# Patient Record
Sex: FEMALE | Race: WHITE | HISPANIC OR LATINO | URBAN - METROPOLITAN AREA
[De-identification: names, ages, dates, MRNs, and addresses within clinical notes are randomized per-mention and may not be internally consistent; named-entity substitution may affect disease eponyms.]

---

## 2017-11-20 ENCOUNTER — EMERGENCY (EMERGENCY)
Facility: HOSPITAL | Age: 72
LOS: 1 days | Discharge: HOME | End: 2017-11-20
Attending: EMERGENCY MEDICINE
Payer: MEDICARE

## 2017-11-20 VITALS
OXYGEN SATURATION: 97 % | RESPIRATION RATE: 19 BRPM | TEMPERATURE: 98 F | SYSTOLIC BLOOD PRESSURE: 158 MMHG | DIASTOLIC BLOOD PRESSURE: 96 MMHG | WEIGHT: 160.06 LBS | HEIGHT: 59 IN | HEART RATE: 105 BPM

## 2017-11-20 VITALS — HEART RATE: 78 BPM | DIASTOLIC BLOOD PRESSURE: 70 MMHG | SYSTOLIC BLOOD PRESSURE: 124 MMHG

## 2017-11-20 DIAGNOSIS — Z90.49 ACQUIRED ABSENCE OF OTHER SPECIFIED PARTS OF DIGESTIVE TRACT: ICD-10-CM

## 2017-11-20 DIAGNOSIS — R10.9 UNSPECIFIED ABDOMINAL PAIN: ICD-10-CM

## 2017-11-20 DIAGNOSIS — Z88.2 ALLERGY STATUS TO SULFONAMIDES: ICD-10-CM

## 2017-11-20 DIAGNOSIS — R39.15 URGENCY OF URINATION: ICD-10-CM

## 2017-11-20 DIAGNOSIS — Z90.710 ACQUIRED ABSENCE OF BOTH CERVIX AND UTERUS: ICD-10-CM

## 2017-11-20 DIAGNOSIS — R30.0 DYSURIA: ICD-10-CM

## 2017-11-20 LAB
ALBUMIN SERPL ELPH-MCNC: 3.1 G/DL — LOW (ref 3.5–5)
ALP SERPL-CCNC: 70 U/L — SIGNIFICANT CHANGE UP (ref 40–120)
ALT FLD-CCNC: 22 U/L DA — SIGNIFICANT CHANGE UP (ref 10–60)
ANION GAP SERPL CALC-SCNC: 10 MMOL/L — SIGNIFICANT CHANGE UP (ref 5–17)
APPEARANCE UR: CLEAR — SIGNIFICANT CHANGE UP
AST SERPL-CCNC: 19 U/L — SIGNIFICANT CHANGE UP (ref 10–40)
BASOPHILS # BLD AUTO: 0.1 K/UL — SIGNIFICANT CHANGE UP (ref 0–0.2)
BASOPHILS NFR BLD AUTO: 0.6 % — SIGNIFICANT CHANGE UP (ref 0–2)
BILIRUB SERPL-MCNC: 0.2 MG/DL — SIGNIFICANT CHANGE UP (ref 0.2–1.2)
BILIRUB UR-MCNC: NEGATIVE — SIGNIFICANT CHANGE UP
BUN SERPL-MCNC: 22 MG/DL — HIGH (ref 7–18)
CALCIUM SERPL-MCNC: 8.7 MG/DL — SIGNIFICANT CHANGE UP (ref 8.4–10.5)
CHLORIDE SERPL-SCNC: 107 MMOL/L — SIGNIFICANT CHANGE UP (ref 96–108)
CO2 SERPL-SCNC: 23 MMOL/L — SIGNIFICANT CHANGE UP (ref 22–31)
COLOR SPEC: YELLOW — SIGNIFICANT CHANGE UP
CREAT SERPL-MCNC: 0.84 MG/DL — SIGNIFICANT CHANGE UP (ref 0.5–1.3)
DIFF PNL FLD: ABNORMAL
EOSINOPHIL # BLD AUTO: 0.1 K/UL — SIGNIFICANT CHANGE UP (ref 0–0.5)
EOSINOPHIL NFR BLD AUTO: 0.4 % — SIGNIFICANT CHANGE UP (ref 0–6)
GLUCOSE SERPL-MCNC: 122 MG/DL — HIGH (ref 70–99)
GLUCOSE UR QL: NEGATIVE — SIGNIFICANT CHANGE UP
HCT VFR BLD CALC: 43.7 % — SIGNIFICANT CHANGE UP (ref 34.5–45)
HGB BLD-MCNC: 14.2 G/DL — SIGNIFICANT CHANGE UP (ref 11.5–15.5)
KETONES UR-MCNC: NEGATIVE — SIGNIFICANT CHANGE UP
LEUKOCYTE ESTERASE UR-ACNC: NEGATIVE — SIGNIFICANT CHANGE UP
LIDOCAIN IGE QN: 117 U/L — SIGNIFICANT CHANGE UP (ref 73–393)
LYMPHOCYTES # BLD AUTO: 15.9 % — SIGNIFICANT CHANGE UP (ref 13–44)
LYMPHOCYTES # BLD AUTO: 2.2 K/UL — SIGNIFICANT CHANGE UP (ref 1–3.3)
MCHC RBC-ENTMCNC: 29.1 PG — SIGNIFICANT CHANGE UP (ref 27–34)
MCHC RBC-ENTMCNC: 32.4 GM/DL — SIGNIFICANT CHANGE UP (ref 32–36)
MCV RBC AUTO: 89.8 FL — SIGNIFICANT CHANGE UP (ref 80–100)
MONOCYTES # BLD AUTO: 0.9 K/UL — SIGNIFICANT CHANGE UP (ref 0–0.9)
MONOCYTES NFR BLD AUTO: 6.3 % — SIGNIFICANT CHANGE UP (ref 2–14)
NEUTROPHILS # BLD AUTO: 10.6 K/UL — HIGH (ref 1.8–7.4)
NEUTROPHILS NFR BLD AUTO: 76.7 % — SIGNIFICANT CHANGE UP (ref 43–77)
NITRITE UR-MCNC: NEGATIVE — SIGNIFICANT CHANGE UP
PH UR: 6 — SIGNIFICANT CHANGE UP (ref 5–8)
PLATELET # BLD AUTO: 332 K/UL — SIGNIFICANT CHANGE UP (ref 150–400)
POTASSIUM SERPL-MCNC: 3.9 MMOL/L — SIGNIFICANT CHANGE UP (ref 3.5–5.3)
POTASSIUM SERPL-SCNC: 3.9 MMOL/L — SIGNIFICANT CHANGE UP (ref 3.5–5.3)
PROT SERPL-MCNC: 7.7 G/DL — SIGNIFICANT CHANGE UP (ref 6–8.3)
PROT UR-MCNC: 30 MG/DL
RBC # BLD: 4.86 M/UL — SIGNIFICANT CHANGE UP (ref 3.8–5.2)
RBC # FLD: 13 % — SIGNIFICANT CHANGE UP (ref 10.3–14.5)
SODIUM SERPL-SCNC: 140 MMOL/L — SIGNIFICANT CHANGE UP (ref 135–145)
SP GR SPEC: 1.02 — SIGNIFICANT CHANGE UP (ref 1.01–1.02)
UROBILINOGEN FLD QL: NEGATIVE — SIGNIFICANT CHANGE UP
WBC # BLD: 13.8 K/UL — HIGH (ref 3.8–10.5)
WBC # FLD AUTO: 13.8 K/UL — HIGH (ref 3.8–10.5)

## 2017-11-20 PROCEDURE — 83690 ASSAY OF LIPASE: CPT

## 2017-11-20 PROCEDURE — 85027 COMPLETE CBC AUTOMATED: CPT

## 2017-11-20 PROCEDURE — 99284 EMERGENCY DEPT VISIT MOD MDM: CPT | Mod: 25

## 2017-11-20 PROCEDURE — 81001 URINALYSIS AUTO W/SCOPE: CPT

## 2017-11-20 PROCEDURE — 80053 COMPREHEN METABOLIC PANEL: CPT

## 2017-11-20 PROCEDURE — 96375 TX/PRO/DX INJ NEW DRUG ADDON: CPT

## 2017-11-20 PROCEDURE — 96374 THER/PROPH/DIAG INJ IV PUSH: CPT

## 2017-11-20 RX ORDER — ONDANSETRON 8 MG/1
4 TABLET, FILM COATED ORAL ONCE
Qty: 0 | Refills: 0 | Status: COMPLETED | OUTPATIENT
Start: 2017-11-20 | End: 2017-11-20

## 2017-11-20 RX ORDER — CEFUROXIME AXETIL 250 MG
1 TABLET ORAL
Qty: 20 | Refills: 0
Start: 2017-11-20 | End: 2017-11-30

## 2017-11-20 RX ORDER — FAMOTIDINE 10 MG/ML
1 INJECTION INTRAVENOUS
Qty: 30 | Refills: 0
Start: 2017-11-20

## 2017-11-20 RX ORDER — CEFTRIAXONE 500 MG/1
1 INJECTION, POWDER, FOR SOLUTION INTRAMUSCULAR; INTRAVENOUS ONCE
Qty: 0 | Refills: 0 | Status: COMPLETED | OUTPATIENT
Start: 2017-11-20 | End: 2017-11-20

## 2017-11-20 RX ORDER — FAMOTIDINE 10 MG/ML
20 INJECTION INTRAVENOUS ONCE
Qty: 0 | Refills: 0 | Status: COMPLETED | OUTPATIENT
Start: 2017-11-20 | End: 2017-11-20

## 2017-11-20 RX ADMIN — Medication 30 MILLILITER(S): at 06:36

## 2017-11-20 RX ADMIN — FAMOTIDINE 20 MILLIGRAM(S): 10 INJECTION INTRAVENOUS at 06:36

## 2017-11-20 RX ADMIN — CEFTRIAXONE 100 GRAM(S): 500 INJECTION, POWDER, FOR SOLUTION INTRAMUSCULAR; INTRAVENOUS at 08:10

## 2017-11-20 RX ADMIN — ONDANSETRON 4 MILLIGRAM(S): 8 TABLET, FILM COATED ORAL at 06:36

## 2017-11-20 NOTE — ED PROVIDER NOTE - MEDICAL DECISION MAKING DETAILS
patient with abdominal and flank pain and urinary complains. patient with abdominal and flank pain and urinary complains.  u/a sugestive of Urinary tract infection. symptoms improved with treatment in Emergency Department. home with ceftin and rx pepcid maalox. primary care physician followup

## 2017-11-20 NOTE — ED PROVIDER NOTE - OBJECTIVE STATEMENT
patient with 1 weeks of left flank pain, dysuria urgency and frequency. was started on cipro by primary care physician but it was too harsh for her stomach so she stopped taking it. she is feeling nauseous, having upper abdominal pain.

## 2020-03-17 ENCOUNTER — RESULT REVIEW (OUTPATIENT)
Age: 75
End: 2020-03-17

## 2021-03-23 RX ORDER — HYDROXYCHLOROQUINE SULFATE 200 MG
1 TABLET ORAL
Qty: 0 | Refills: 0 | DISCHARGE
Start: 2021-03-23

## 2021-03-29 ENCOUNTER — TRANSCRIPTION ENCOUNTER (OUTPATIENT)
Age: 76
End: 2021-03-29

## 2021-03-29 ENCOUNTER — EMERGENCY (EMERGENCY)
Facility: HOSPITAL | Age: 76
LOS: 1 days | Discharge: HOME | End: 2021-03-29
Attending: EMERGENCY MEDICINE | Admitting: PERSONAL EMERGENCY RESPONSE ATTENDANT
Payer: MEDICAID

## 2021-03-29 VITALS
HEART RATE: 88 BPM | OXYGEN SATURATION: 100 % | RESPIRATION RATE: 16 BRPM | DIASTOLIC BLOOD PRESSURE: 72 MMHG | TEMPERATURE: 98 F | HEIGHT: 59 IN | SYSTOLIC BLOOD PRESSURE: 141 MMHG

## 2021-03-29 VITALS
RESPIRATION RATE: 20 BRPM | OXYGEN SATURATION: 94 % | DIASTOLIC BLOOD PRESSURE: 81 MMHG | HEART RATE: 74 BPM | TEMPERATURE: 98 F | SYSTOLIC BLOOD PRESSURE: 158 MMHG

## 2021-03-29 LAB
ALBUMIN SERPL ELPH-MCNC: 3.7 G/DL — SIGNIFICANT CHANGE UP (ref 3.3–5)
ALP SERPL-CCNC: 61 U/L — SIGNIFICANT CHANGE UP (ref 40–120)
ALT FLD-CCNC: 23 U/L — SIGNIFICANT CHANGE UP (ref 4–33)
ANION GAP SERPL CALC-SCNC: 13 MMOL/L — SIGNIFICANT CHANGE UP (ref 7–14)
APPEARANCE UR: CLEAR — SIGNIFICANT CHANGE UP
APTT BLD: 29.4 SEC — SIGNIFICANT CHANGE UP (ref 27–36.3)
AST SERPL-CCNC: 32 U/L — SIGNIFICANT CHANGE UP (ref 4–32)
BACTERIA # UR AUTO: ABNORMAL
BASOPHILS # BLD AUTO: 0 K/UL — SIGNIFICANT CHANGE UP (ref 0–0.2)
BASOPHILS NFR BLD AUTO: 0 % — SIGNIFICANT CHANGE UP (ref 0–2)
BILIRUB SERPL-MCNC: 0.3 MG/DL — SIGNIFICANT CHANGE UP (ref 0.2–1.2)
BILIRUB UR-MCNC: NEGATIVE — SIGNIFICANT CHANGE UP
BLOOD GAS VENOUS COMPREHENSIVE RESULT: SIGNIFICANT CHANGE UP
BUN SERPL-MCNC: 10 MG/DL — SIGNIFICANT CHANGE UP (ref 7–23)
CALCIUM SERPL-MCNC: 9.2 MG/DL — SIGNIFICANT CHANGE UP (ref 8.4–10.5)
CHLORIDE SERPL-SCNC: 96 MMOL/L — LOW (ref 98–107)
CO2 SERPL-SCNC: 26 MMOL/L — SIGNIFICANT CHANGE UP (ref 22–31)
COLOR SPEC: YELLOW — SIGNIFICANT CHANGE UP
CREAT SERPL-MCNC: 0.59 MG/DL — SIGNIFICANT CHANGE UP (ref 0.5–1.3)
DIFF PNL FLD: NEGATIVE — SIGNIFICANT CHANGE UP
EOSINOPHIL # BLD AUTO: 0.07 K/UL — SIGNIFICANT CHANGE UP (ref 0–0.5)
EOSINOPHIL NFR BLD AUTO: 1 % — SIGNIFICANT CHANGE UP (ref 0–6)
EPI CELLS # UR: SIGNIFICANT CHANGE UP /HPF (ref 0–5)
GLUCOSE SERPL-MCNC: 125 MG/DL — HIGH (ref 70–99)
GLUCOSE UR QL: NEGATIVE — SIGNIFICANT CHANGE UP
HCT VFR BLD CALC: 48 % — HIGH (ref 34.5–45)
HGB BLD-MCNC: 15.2 G/DL — SIGNIFICANT CHANGE UP (ref 11.5–15.5)
IANC: 5.33 K/UL — SIGNIFICANT CHANGE UP (ref 1.5–8.5)
INR BLD: 1.25 RATIO — HIGH (ref 0.88–1.16)
KETONES UR-MCNC: ABNORMAL
LEUKOCYTE ESTERASE UR-ACNC: NEGATIVE — SIGNIFICANT CHANGE UP
LIDOCAIN IGE QN: 25 U/L — SIGNIFICANT CHANGE UP (ref 7–60)
LYMPHOCYTES # BLD AUTO: 0.65 K/UL — LOW (ref 1–3.3)
LYMPHOCYTES # BLD AUTO: 10 % — LOW (ref 13–44)
MCHC RBC-ENTMCNC: 27.2 PG — SIGNIFICANT CHANGE UP (ref 27–34)
MCHC RBC-ENTMCNC: 31.7 GM/DL — LOW (ref 32–36)
MCV RBC AUTO: 86 FL — SIGNIFICANT CHANGE UP (ref 80–100)
MONOCYTES # BLD AUTO: 0.33 K/UL — SIGNIFICANT CHANGE UP (ref 0–0.9)
MONOCYTES NFR BLD AUTO: 5 % — SIGNIFICANT CHANGE UP (ref 2–14)
NEUTROPHILS # BLD AUTO: 5.35 K/UL — SIGNIFICANT CHANGE UP (ref 1.8–7.4)
NEUTROPHILS NFR BLD AUTO: 81 % — HIGH (ref 43–77)
NITRITE UR-MCNC: POSITIVE
PH UR: 6.5 — SIGNIFICANT CHANGE UP (ref 5–8)
PLATELET # BLD AUTO: 194 K/UL — SIGNIFICANT CHANGE UP (ref 150–400)
POTASSIUM SERPL-MCNC: 4.1 MMOL/L — SIGNIFICANT CHANGE UP (ref 3.5–5.3)
POTASSIUM SERPL-SCNC: 4.1 MMOL/L — SIGNIFICANT CHANGE UP (ref 3.5–5.3)
PROT SERPL-MCNC: 7.6 G/DL — SIGNIFICANT CHANGE UP (ref 6–8.3)
PROT UR-MCNC: ABNORMAL
PROTHROM AB SERPL-ACNC: 14.2 SEC — HIGH (ref 10.6–13.6)
RBC # BLD: 5.58 M/UL — HIGH (ref 3.8–5.2)
RBC # FLD: 14.4 % — SIGNIFICANT CHANGE UP (ref 10.3–14.5)
RBC CASTS # UR COMP ASSIST: <5 /HPF — HIGH (ref 0–4)
SARS-COV-2 RNA SPEC QL NAA+PROBE: DETECTED
SODIUM SERPL-SCNC: 135 MMOL/L — SIGNIFICANT CHANGE UP (ref 135–145)
SP GR SPEC: 1.02 — SIGNIFICANT CHANGE UP (ref 1.01–1.02)
UROBILINOGEN FLD QL: SIGNIFICANT CHANGE UP
WBC # BLD: 6.53 K/UL — SIGNIFICANT CHANGE UP (ref 3.8–10.5)
WBC # FLD AUTO: 6.53 K/UL — SIGNIFICANT CHANGE UP (ref 3.8–10.5)
WBC UR QL: <5 /HPF — SIGNIFICANT CHANGE UP (ref 0–5)

## 2021-03-29 PROCEDURE — 71045 X-RAY EXAM CHEST 1 VIEW: CPT | Mod: 26

## 2021-03-29 PROCEDURE — 74177 CT ABD & PELVIS W/CONTRAST: CPT | Mod: 26

## 2021-03-29 PROCEDURE — 99285 EMERGENCY DEPT VISIT HI MDM: CPT

## 2021-03-29 RX ORDER — SODIUM CHLORIDE 9 MG/ML
1000 INJECTION INTRAMUSCULAR; INTRAVENOUS; SUBCUTANEOUS ONCE
Refills: 0 | Status: COMPLETED | OUTPATIENT
Start: 2021-03-29 | End: 2021-03-29

## 2021-03-29 RX ORDER — CEFTRIAXONE 500 MG/1
1000 INJECTION, POWDER, FOR SOLUTION INTRAMUSCULAR; INTRAVENOUS ONCE
Refills: 0 | Status: COMPLETED | OUTPATIENT
Start: 2021-03-29 | End: 2021-03-29

## 2021-03-29 RX ORDER — ONDANSETRON 8 MG/1
4 TABLET, FILM COATED ORAL ONCE
Refills: 0 | Status: COMPLETED | OUTPATIENT
Start: 2021-03-29 | End: 2021-03-29

## 2021-03-29 RX ORDER — ACETAMINOPHEN 500 MG
650 TABLET ORAL ONCE
Refills: 0 | Status: COMPLETED | OUTPATIENT
Start: 2021-03-29 | End: 2021-03-29

## 2021-03-29 RX ORDER — MORPHINE SULFATE 50 MG/1
4 CAPSULE, EXTENDED RELEASE ORAL ONCE
Refills: 0 | Status: DISCONTINUED | OUTPATIENT
Start: 2021-03-29 | End: 2021-03-29

## 2021-03-29 RX ADMIN — Medication 650 MILLIGRAM(S): at 06:13

## 2021-03-29 RX ADMIN — SODIUM CHLORIDE 1000 MILLILITER(S): 9 INJECTION INTRAMUSCULAR; INTRAVENOUS; SUBCUTANEOUS at 04:42

## 2021-03-29 RX ADMIN — MORPHINE SULFATE 4 MILLIGRAM(S): 50 CAPSULE, EXTENDED RELEASE ORAL at 04:42

## 2021-03-29 RX ADMIN — CEFTRIAXONE 100 MILLIGRAM(S): 500 INJECTION, POWDER, FOR SOLUTION INTRAMUSCULAR; INTRAVENOUS at 08:19

## 2021-03-29 RX ADMIN — ONDANSETRON 4 MILLIGRAM(S): 8 TABLET, FILM COATED ORAL at 10:20

## 2021-03-29 RX ADMIN — ONDANSETRON 4 MILLIGRAM(S): 8 TABLET, FILM COATED ORAL at 04:42

## 2021-03-29 NOTE — ED PROVIDER NOTE - OBJECTIVE STATEMENT
75yF h/o breast cancer, Covid+ (3/22/21) presents with diffuse body aches, fevers, n/v, abdominal pain and decrease PO of 4 day duration. Patient states that she vomits every time she tries to eat (5-10/per day) along with diffuse abdominal pain. No alleviating factors. Reports intermittent diarrhea but denies chest pain, sob, back pain.

## 2021-03-29 NOTE — ED ADULT NURSE NOTE - OBJECTIVE STATEMENT
unsure
received pt in room 14... pt +covid 3/22. states has abd pain, vomiting and diarrhea everyday... 6-7 episodes a day... fever (tmax 101 and higher).  hx of left breast cancer with surgery and radiation.  pt aa&ox4, amb at baseline. 20 gauge inserted right ac. blood and urine sent.  given meds as ordered.  placed on cardiac monitor.  xray done.  waiting for cat scan.  rpeorts feeling better after morphine.

## 2021-03-29 NOTE — ED PROVIDER NOTE - CLINICAL SUMMARY MEDICAL DECISION MAKING FREE TEXT BOX
75yF h/o breast cancer, Covid+ (3/22/21) presents with diffuse body aches, fevers, n/v, abdominal pain and decrease PO of 4 day duration. Concern for but not limited to pancreatitis vs gastroenteritis vs biliary pathology vs covid. Will get ekg, labs, coags, blood gas, UA, cultures, CT abd/pelvis, antiemetics, pain management and reassess

## 2021-03-29 NOTE — ED ADULT TRIAGE NOTE - CHIEF COMPLAINT QUOTE
COVID + on 3/22. pt c/o worsening fevers, chills, body aches and decreased PO intake with nausea and vomiting

## 2021-03-29 NOTE — ED PROVIDER NOTE - PHYSICAL EXAMINATION
Gen: AAOx3, non-toxic  Head: NCAT  HEENT: EOMI, oral mucosa moist, normal conjunctiva  Lung: CTAB, no respiratory distress, speaking in full sentences  CV: RRR, no murmurs, rubs or gallops  Abd: soft, epigastric ttp, no guarding, no CVA tenderness  MSK: no visible deformities  Neuro: No focal sensory or motor deficits  Skin: Warm, well perfused, no rash  Psych: normal affect.   ~Cornelius Hood M.D. Resident

## 2021-03-29 NOTE — ED PROVIDER NOTE - PROGRESS NOTE DETAILS
pt feeling better no vomit in ED> if pass po trial will dc home. Sign out to day team Attending MD Cervantes.  Pt signed out to me in stable condition pending PO challenge, d/c with abxs if tolerates and strict return, 74 yo fem COVID+, presented 3/22 without resp comp, here with abd'l pain, vomiting, CTAP non-actionable in ED, planned PO challenge and prob d/c with return precautions, UA + will tx for UTI. Attending MD Cervantes.  Pt's daughter Isaura has called who is ok with her going home if she's able to tolerate PO.  She is aware of plan and results.  Per daughter pt has seen a urologist and gynecologist and has had testing done and it's unclear why she has this chronic urinary problem.  She has had extensive work-up at [facility] for same complaint.  Days she can't get out of bed because of her urinary tract issue. Daughter asking to have call back re: plan of care. Osiel Sanders MD:  Patient reassessed, able to tolerate PO, eating full plates of food and drinking water without vomiting. Discussed with daughter and patient, both express feel patient is safe for discharge home with careful return precautions.

## 2021-03-29 NOTE — ED PROVIDER NOTE - NSFOLLOWUPINSTRUCTIONS_ED_ALL_ED_FT
COVID-19 (Coronavirus Disease 2019)    Please  keflex 500 mg from [facility], take pills four times a day for 5 days.    WHAT YOU NEED TO KNOW:    What do I need to know about coronavirus disease 2019 (COVID-19)? COVID-19 is the disease caused by the novel (new) coronavirus first discovered in December 2019. Coronaviruses generally cause upper respiratory (nose, throat, and lung) infections, such as a cold. The new virus can also cause serious lower respiratory conditions, such as pneumonia or acute respiratory distress syndrome (ARDS). Anyone can develop serious problems from the new virus, but your risk is higher if you are 65 or older. A weak immune system, diabetes, or a heart or lung condition can also increase your risk.    What are the signs and symptoms of COVID-19? You may not develop any signs or symptoms. Signs and symptoms that do develop usually start about 5 days after infection but can take 2 to 14 days. Signs and symptoms range from mild to severe. You may feel like you have the flu or a bad cold. Information on COVID-19 is still being learned. Tell your healthcare provider if you think you were infected but develop signs or symptoms not listed below:  •A cough  •Shortness of breath or trouble breathing that may become severe  •A fever of at least 100.4°F, or 38°C (may be lower in adults 65 or older)  •Chills that might include shaking  •Muscle pain, body aches, or a headache  •A sore throat  •Suddenly not being able to taste or smell anything  •Feeling mentally and physically tired (fatigue)  •Congestion (stuffy head and nose), or a runny nose.  •Diarrhea, nausea, or vomiting    How is COVID-19 diagnosed? If you think you have COVID-19, call your healthcare provider. In some areas, testing is only done if a person has severe symptoms or is hospitalized. Testing is done more widely in other places. Your provider will tell you what to do based on your symptoms and the rules in your area. In general, the following may be used:   •A viral test shows if you have a current infection. Samples are taken from your nose and throat, usually with swabs. You may need to wait several days to get the test results. Your healthcare provider will tell you how to get your results. You will need to quarantine (stay physically away from others) until you get your results. If results show you have COVID-19, you will need to quarantine until you are well. Your provider or other health official may give you more directions. You will also need to prevent another infection until it is known if you can get COVID-19 again.  •An antibody test shows if you had a past infection. Blood samples are used for this test. Antibodies are made by your immune system to attack the virus that causes COVID-19. Antibodies will form 1 to 3 weeks after you are infected. It is not known if antibodies prevent a second infection, or for how long a person might be protected. If you have antibodies, you will still need to be careful around others until more is known.  •CT scans or x-rays may be used to check for signs of pneumonia. The 2019 coronavirus causes a specific kind of pneumonia, usually in both lungs.      How is COVID-19 treated? No medicine or specific treatment is currently approved for COVID-19. The following may be used to manage your symptoms or treat the effects of COVID-19:   •Mild symptoms may get better on their own. If you do not need to be treated in a hospital, you will be given instructions to use at home. Your condition will be closely monitored. You will need to watch for worsening symptoms and seek immediate care if needed. Talk to your healthcare provider about the following:?Relieve your symptoms. To soothe a sore throat, gargle with warm salt water, or use throat lozenges or a throat spray. Your healthcare provider may recommend a cough medicine. Drink more liquids to thin and loosen mucus and to prevent dehydration. Use decongestants or saline drops as directed for nasal congestion.  ?NSAIDs or acetaminophen can help lower a fever and relieve body aches or a headache. Follow directions. If not taken correctly, NSAIDs can cause kidney damage and acetaminophen can cause liver damage.    •Severe or life-threatening symptoms are treated in the hospital. You may need a combination of the following:?Medicines may be given to reduce inflammation or to fight the virus. You may also need blood thinners to prevent or treat blood clots. If you have a deep vein thrombosis (DVT) or pulmonary embolism (PE), you may need to keep using blood thinners for 3 months.  ?Extra oxygen may be given if you have respiratory failure. This means your lungs cannot get enough oxygen into your blood and out to your organs. Extra oxygen can help prevent organ failure.  ?A ventilator may be used to help you breathe.  ?Convalescent plasma (part of blood) from a patient who has recovered from COVID-19 may be used. The plasma contains antibodies that can help your body fight the infection. Convalescent plasma is only given to patients who have severe signs and symptoms.        How does the 2019 coronavirus spread? The virus spreads quickly and easily. You can become infected if you are in contact with a large amount of the virus, even for a short time. You can also become infected by being around a small amount of virus for a long time. The following are ways the virus is thought to spread, but more information may be coming:   •Droplets are the most common way all coronaviruses spread. The virus can travel in droplets that form when a person talks, coughs, or sneezes. Anyone who breathes in the droplets or gets them in his or her eyes can become infected with the virus. Close personal contact with an infected person is thought to be the main way the virus spreads. Close personal contact means you are within 6 feet (2 meters) of the person.  •Person-to-person contact can spread the virus. For example, a person with the virus on his or her hands can spread it by shaking hands with someone. At this time, it does not appear that the virus can be passed to a baby during pregnancy or delivery. The baby can be infected after he or she is born through person-to-person contact. The virus also does not appear to spread in breast milk. If you are pregnant or breastfeeding, talk to your healthcare provider or obstetrician about any concerns you have.  •The virus can stay on objects and surfaces. A person can get the virus on his or her hands by touching the object or surface. Infection happens if the person then touches his or her eyes or mouth with unwashed hands. It is not yet known how long the virus can stay on an object or surface. That is why it is important to clean all surfaces that are used regularly.  •An infected animal may be able to infect a person who touches it. This may happen at live markets or on a farm.      How can everyone lower the risk for COVID-19? The best way to prevent infection is to avoid anyone who is infected, but this can be hard to do. An infected person can spread the virus before signs or symptoms begin, or even if signs or symptoms never develop. The following can help lower the risk for infection:   Limit the Spread of Infectious Disease    •Wash your hands often throughout the day. Use soap and water. Rub your soapy hands together, lacing your fingers. Wash the front and back of each hand, and in between your fingers. Use the fingers of one hand to scrub under the fingernails of the other hand. Wash for at least 20 seconds. Rinse with warm, running water for several seconds. Then dry your hands with a clean towel or paper towel. Use hand  that contains alcohol if soap and water are not available. Do not touch your eyes, nose, or mouth without washing your hands first. Teach children how to wash their hands and use hand .  Handwashing  •Cover a sneeze or cough. This prevents droplets from traveling from you to others. Turn your face away and cover your mouth and nose with a tissue. Throw the tissue away. Use the bend of your arm if a tissue is not available. Then wash your hands well with soap and water or use hand . Turn and cover your face if you are around someone who is sneezing or coughing. Teach children how to cover a cough or sneeze.  •Follow worldwide, national, and local social distancing guidelines. Social distancing means people avoid close physical contact so the virus cannot spread from one person to another. Keep at least 6 feet (2 meters) between you and others. Also keep this distance from anyone who comes to your home, such as someone making a delivery.  •Make a habit of not touching your face. It is not known how long the virus can stay on objects and surfaces. If you get the virus on your hands, you can transfer it to your eyes, nose, or mouth and become infected. You can also transfer it to objects, surfaces, or people. Be aware of what you touch when you go out. Examples include handrails and elevator buttons. Try not to touch anything with bare hands unless it is necessary. Wash your hands before you leave your home and when you return.  •Clean and disinfect high-touch surfaces and objects often. Use a disinfecting solution or wipes. You can make a solution by diluting 4 teaspoons of bleach in 1 quart (4 cups) of water. Clean and disinfect even if you think no one living in or coming to your home is infected with the virus. You can wipe items with a disinfecting cloth before you bring them into your home. Wash your hands after you handle anything you bring into your home.  •Make your immune system as healthy as possible. A weakened immune system makes you more vulnerable to the new coronavirus. No COVID-19 vaccine is available yet. Vaccines such as the flu and pneumonia vaccines can help your immune system. Your healthcare provider can tell you which vaccines to get, and when to get them. Keep your immune system as strong as possible. Do not smoke. Eat healthy foods, exercise regularly, and try to manage stress. Go to bed and wake up at the same times each day.        How do I follow social distancing guidelines to help lower the risk for COVID-19? National and local social distancing rules vary. Rules may change over time as restrictions are lifted. Restrictions may return if an outbreak happens where you live. It is important to know and follow all current social distancing rules in your area. The following are general guidelines:  •Limit trips out of your home. You may be able to have food, medicines, and other supplies delivered. If possible, have delivered items left at your door or other area. Try not to have someone hand you an item. You will be so close to the person that the virus can spread between you.  •Do not have close physical contact with anyone who does not live in your home. Do not shake hands with, hug, or kiss a person as a greeting. Stand or walk as far from others as possible. If you must use public transportation (such as a bus or subway), try to sit or stand away from others. You can stay safely connected with others through phone calls, e-mail messages, social media websites, and video chats. Check in on anyone who may be having a hard time socially distancing, or who lives alone. Ask administrators at nursing homes or long-term care facilities how you can safely communicate with someone living there.  •Wear a cloth face covering around others who do not live in your home. Face coverings help prevent the virus from spreading to others in droplets. You can use a clear face covering if someone needs to read your lips. This is a cloth covering that has plastic over the mouth area so your lips can be seen. Do not use coverings that have breathing valves or vents. The virus can travel out of the valve or vent and be spread to others. Do not take your covering off to talk, cough, or sneeze. Do not use coverings on children younger than 2 years or on anyone who has breathing problems or cannot remove it.  •Only allow medical or other necessary professionals into your home. Wear your face covering, and remind professionals to wear a face covering. Remind them to wash their hands when they arrive and before they leave. Do not let anyone who does not live in your home in, even if the person is not sick. A person can pass the virus to others before symptoms of COVID-19 begin. Some people never even develop symptoms. Children commonly have mild symptoms or no symptoms. It may be hard to tell a child not to hug or kiss you. Explain that this is how he or she can help you stay healthy.  •Do not go to someone else's home unless it is necessary. Do not go over to visit, even if the person is lonely. Only go if you need to help him or her. Make sure you both wear face coverings while you are there.  •Avoid large gatherings and crowds. Gatherings or crowds of 10 or more individuals can cause the virus to spread. Examples of gatherings include parties, sporting events, Jainism services, and conferences. Crowds may form at beaches, chand, and tourist attractions. Protect yourself by staying away from large gatherings and crowds.  •Ask your healthcare provider for other ways to have appointments. You may be able to have appointments without having to go into the provider's office. Some providers offer phone, video, or other types of appointments. You may also be able to get prescriptions for a few months of your medicines at a time.  •Stay safe if you must go out to work. You may have a job that can only be done outside your home. Keep physical distance between you and other workers as much as possible. Follow your employer's rules so everyone stays safe.      What should I do if I have COVID-19 and am recovering at home? Healthcare providers will give you specific instructions to follow. The following are general guidelines to remind you how to keep others safe until you are well:   •Wash your hands often. Use soap and water as much as possible. You can use hand  that contains alcohol if soap and water are not available. Do not share towels with anyone. If you use paper towels, throw them away in a lined trash can kept in your room or area. Use a covered trash can, if possible.  •Do not go out of your home unless it is necessary. You may have to go to your healthcare provider's office for check-ups or to get prescription refills. Do not arrive at the provider's office without an appointment. Providers have to make their offices safe for staff and other patients.  •Do not have close physical contact with anyone unless it is necessary. Only have close physical contact with a person giving direct care, or a baby or child you must care for. Family members and friends should not visit you. If possible, stay in a separate area or room of your home if you live with others. No one should go into the area or room except to give you care. You can visit with others by phone, video chat, e-mail, or similar systems. It is important to stay connected with others in your life while you recover.  •Wear a face covering while others are near you. This can help prevent droplets from spreading the virus when you talk, sneeze, or cough. Put the covering on before anyone comes into your room or area. Remind the person to cover his or her nose and mouth before going in to provide care for you.  •Do not share items. Do not share dishes, towels, or other items with anyone. Items need to be washed after you use them.  •Protect your baby. Wash your hands with soap and water often throughout the day. Wear a clean face covering while you breastfeed, or while you express or pump breast milk. If possible, ask someone who is well to care for your baby. You can put breast milk in bottles for the person to use, if needed. Talk to your healthcare provider if you have any questions or concerns about caring for or bonding with your baby. He or she will tell you when to bring your baby in for check-ups and vaccines. He or she will also tell you what to do if you think your baby was infected with the new virus.  •Do not handle live animals. Until more is known, it is best not to touch, play with, or handle live animals. Some animals, including pets, have been infected with the new coronavirus. Do not handle or care for animals until you are well. Care includes feeding, petting, and cuddling your pet. Do not let your pet lick you or share your food. Ask someone who is not infected to take care of your pet, if possible. If you must care for a pet, wear a face covering. Wash your hands before and after you give care.  •Follow directions from your healthcare provider for being around others after you recover. You will need to wait at least 10 days after symptoms first appeared. Then you will need to have no fever for 24 hours without fever medicine, and no other symptoms. A loss of taste or smell may continue for several months. It is considered okay to be around others if this is your only symptom. It is not known for sure if or for how long a recovered person can pass the virus to others. Your provider may give you instructions, such as continuing social distancing or wearing a face covering around others.  How should I take care of someone who has COVID-19? If the person lives in another home, arrange for a time to give care. Remember to bring a few pairs of disposable gloves and a cloth face covering. The following are general guidelines to help you safely care for anyone who has COVID-19:  •Wash your hands often. Wash before and after you go into the person's home, area, or room. Throw paper towels away in a lined trash can that has a lid, if possible.  •Do not allow others to go near the person. No one should come into the person's home unless it is necessary. If possible, the person should be in a separate area or room if he or she lives with others. Keep the room's door shut unless you need to go in or out. Have others call, video chat, or e-mail the person if he or she is feeling well enough. The person may feel lonely if he or she is kept separate for a long period of time. Safe communication can help him or her stay connected to family and friends.  •Make sure the person's room has good air flow. You may be able to open the window if the weather allows. An air conditioner can also be turned on to help air move.  •Contact the person before you go in to give care. Make sure the person is wearing a face covering. Remind him or her to wash his or her hands with soap and water. He or she can use hand  that contains alcohol if soap and water are not available. Put on a face covering before you go in to give care.  •Wear gloves while you give care and clean. Clean items the person uses often. Clean countertops, cooking surfaces, and the fronts and insides of the microwave and refrigerator. Clean the shower, toilet, the area around the toilet, the sink, the area around the sink, and faucets. Gather used laundry or bedding. Wash and dry items on the warmest settings the fabric allows. Wash dishes and silverware in hot, soapy water or in a .  •Anything you throw away needs to go into a lined trash can. When you need to empty the trash, close the open end of the lining and tie it closed. This helps prevent items the virus is on from spilling out of the trash. Remove your gloves and throw them away. Wash your hands.      Where can I find more information?   •[facility] [employer] Prevention  [address] [place] [address]  Phone: 1-528.947.3201  Web Address: http://www.cdc.gov    What should I do if I think I or someone I know may be infected? Do the following to protect others:   •If emergency care is needed, tell the  about the possible infection, or call ahead and tell the emergency department.  •Call a healthcare provider for instructions if symptoms are mild. Anyone who may be infected should not arrive without calling first. The provider will need to protect staff members and other patients.  •The person who may be infected needs to wear a face covering while getting medical care. This will help lower the risk of infecting others. Coverings are not used for anyone who is younger than 2 years, has breathing problems, or cannot remove it. The provider can give you instructions for anyone who cannot wear a covering.      Call your local emergency number (911 in the [place]) or an emergency department if:   •You have trouble breathing or shortness of breath at rest.  •You have chest pain or pressure that lasts longer than 5 minutes.  •You become confused or hard to wake.  •Your lips or face are blue.  •You have a fever of 104°F (40°C) or higher.  When should I call my doctor?   •You do not have symptoms of COVID-19 but had close physical contact within 14 days with someone who tested positive.  •You have questions or concerns about your condition or care.      CARE AGREEMENT:  You have the right to help plan your care. Learn about your health condition and how it may be treated. Discuss treatment options with your healthcare providers to decide what care you want to receive. You always have the right to refuse treatment.       COVID-19 (Enfermedad por coronavirus 2019)  LO QUE NECESITA SABER:  ¿Qué necesito saber acerca de la enfermedad por coronavirus 2019 (COVID-19)?COVID-19 es la enfermedad causada por el nuevo coronavirus descubierto por primera vez en diciembre de 2019. Los coronavirus generalmente causan infecciones de las vías respiratorias superiores (nariz, garganta y pulmones), ankur un resfriado. El nuevo virus también puede causar afecciones respiratorias inferiores graves, ankur la neumonía o el síndrome de dificultad respiratoria aguda (SDRA). Cualquier persona puede desarrollar problemas graves a causa del nuevo virus, elena el riesgo es mayor si tiene 65 años o más. Un sistema inmunitario débil, la diabetes o isabel enfermedad cardíaca o pulmonar también pueden aumentar el riesgo.    ¿Cuáles son los signos y síntomas de la COVID-19?Es posible que no presente ningún signo o síntoma. Los signos y síntomas que se presentan suelen empezar unos 5 días después de la infección elena pueden tardar de 2 a 14 días. Los signos y síntomas pueden variar de leves a severos. Puede sentir ankur si tuviera gripe o un resfriado erin. La información sobre COVID-19 todavía se está aprendiendo. Dígale a jaqeuz médico si amanda que se ha infectado elena desarrolla signos o síntomas que no se enumeran a continuación:  •Tos  •Falta de aliento o dificultad para respirar que puede llegar a ser grave  •Isabel fiebre de, al menos, 100.4 °F, o 38 °C (puede ser más baja en los adultos de 65 años o más)  •Escalofríos que pueden incluir temblores  •Dolor muscular, louis corporales o dolor de dayan  •El dolor de garganta  •De repente, no ser capaz de probar u oler nada  •Sensación de cansancio físico y mental (fatiga)  •Congestión (de la nariz y la dayan) o flujo nasal  •Diarrea, náuseas o vómitos  ¿Cómo se diagnostica la COVID-19?Llame a jaquez médico si piensa que puede tener COVID-19. En algunas zonas, solo se realizan pruebas si isabel persona tiene síntomas graves o es hospitalizada. Las pruebas se hacen más ampliamente en otros lugares. Jaquez médico le dirá lo que debe hacer basándose en jie síntomas y en las normas de jaquez aaron. En general, se puede utilizar lo siguiente:   •Un examen viralmuestra si tiene isabel infección actualmente. Se rita muestras de la nariz y la garganta, usualmente con hisopos. Es posible que tenga que esperar varios días para obtener los resultados de la prueba. Jaquez médico le dirá cómo obtener los resultados. Tendrá que ponerse en cuarentena (mantenerse físicamente alejado de los demás) hasta que obtenga los resultados. Si los resultados muestran que tiene COVID-19, tendrá que ponerse en cuarentena hasta que esté vazquez. Jaquez médico u otro oficial de anila pueden darle más instrucciones. También tendrá que prevenir otra infección hasta que se sepa si puede contraer COVID-19 de nuevo.  •Isabel prueba de anticuerposmuestra si tuvo isabel infección en el pasado. Para esta prueba se utilizan muestras de melanie. Los anticuerpos son producidos por el sistema inmunitario para atacar el virus que causa la COVID-19. Los anticuerpos se formarán de 1 a 3 semanas después de que se contagie. No se sabe si los anticuerpos previenen isabel segunda infección, o por cuánto tiempo isabel persona podría estar protegida. Si tiene anticuerpos, tendrá que tener cuidado con los demás hasta que se sepa más.  •Tomografías o radiografíaspodrían realizarse para comprobar si existen signos de neumonía. El coronavirus 2019 causa un tipo específico de neumonía, generalmente en ambos pulmones.    ¿Cómo se trata la COVID-19?Ningún medicamento o tratamiento específico está actualmente aprobado para la COVID-19. Lo siguiente puede utilizarse para controlar los síntomas o tratar los efectos de la COVID-19:   •Los síntomas levespodrían mejorar por sí solos. Si no necesita ser tratado en un hospital, se le darán instrucciones para que siga en jaquez casa. Controlarán atentamente jaquez estado. Deberá estar atento al empeoramiento de los síntomas y buscar atención inmediata si es necesario. Hable con jaquez médico acerca de lo siguiente:?Aliviar los síntomas.Para aliviar el dolor de garganta, bailey gárgaras con agua salada tibia, o use pastillas para la garganta o un aerosol para la garganta. Jaquez médico puede recomendarle un medicamento para la tos. Dayami más líquidos para disolver y aflojar la mucosidad y para prevenir la deshidratación. Use descongestionantes o gotas de solución salina ankur se indica para la congestión nasal.  ?Los JAG o el acetaminofenopueden ayudar a bajar la fiebre y aliviar los louis corporales o el dolor de dayan. Siga las indicaciones. Si no se rita correctamente, los JAG pueden causar sangrado estomacal o daño renal y el acetaminofeno puede dañar hepático.  •Los síntomas severos o potencialmente mortalesse tratan en el hospital. Es posible que usted necesite isabel combinación de los siguientes:?Los medicamentospueden administrarse para reducen la inflamación o combatir el virus. También podría necesitar anticoagulantes para prevenir o tratar los coágulos de melanie. Si tiene trombosis venosa profunda (TVP) o embolia pulmonar (PE), sanjuana vez necesite seguir usando anticoagulantes vicente 3 meses.  ?El oxígeno adicionalpodría administrarse si tiene insuficiencia respiratoria. [place] significa que los pulmones no pueden llevar suficiente oxígeno a la melanie y a los órganos. El oxígeno extra puede ayudar a prevenir la insuficiencia orgánica.  ?Un respiradorpodría usarse para ayudarlo a respirar.  ?El plasma (parte de la melanie) de convalecientede un paciente que se ha recuperado de la COVID-19 puede utilizarse. El plasma contiene anticuerpos que pueden ayudar a jaquez cuerpo a combatir la infección. El plasma de convaleciente solo se administra a pacientes que tienen signos y síntomas severos.  ¿Cómo se propaga el coronavirus 2019?El virus se propaga rápida y fácilmente. Puede infectarse si está en contacto con isabel gran cantidad del virus, incluso vicente poco tiempo. También puede infectarse por estar cerca de isabel pequeña cantidad del virus vicente mucho tiempo. A continuación se indican las formas en que se amanda que se propaga el virus, elena es posible que surja más información:   •Las gotitas son la forma más común de propagación de todos los coronavirus.El virus puede viajar en gotitas que se gia cuando isabel persona habla, tose o estornuda. Cualquiera que respire las gotitas o que las gotitas se le metan en los ojos puede infectarse con el virus. Se amanda que el contacto personal cercano con isabel persona infectada es la principal forma de propagación del virus. El contacto personal cercano significa estar a menos de 6 pies (2 metros) de otra persona.  •El contacto de persona a persona puede propagar el virus.Por ejemplo, isabel persona con el virus en jie agustin puede propagarlo al darle la mano a alguien. En savannah momento, no parece que el virus pueda transmitirse a un bebé vicente el embarazo o el parto. El bebé puede infectarse después de nacer por contacto de persona a persona. El virus tampoco parece propagarse por la leche materna. Si está embarazada o amamantando, hable con jaquez médico u obstetra sobre cualquier preocupación que tenga.  •El virus puede permanecer en objetos y superficies.Isabel persona puede contraer el virus en jie agustin al tocar el objeto o la superficie. La infección se produce si la persona se toca los ojos o la boca sin antes lavarse las agustin. Aún no se sabe cuánto tiempo puede permanecer el virus en un objeto o superficie. Por eso es importante limpiar todas las superficies que se usan regularmente.  •Un animal infectado puede ser capaz de infectar a isabel persona que lo toque.[place] puede ocurrir en mercados vivos o en isabel osvaldo.  ¿Cómo puede todo el murali reducir el riesgo de COVID-19?La mejor manera de prevenir la infección es evitar a cualquiera que esté infectado, elena esto puede ser difícil de lograr. Isabel persona infectada puede propagar el virus antes de que aparezcan los signos o síntomas, o incluso si los signos o síntomas nunca se desarrollan. Lo siguiente puede ayudar a reducir el riesgo de infección:   Limite la propagación de las enfermedades infecciosas  •Lávese las agustin con frecuencia vicente el día.Utilice agua y jabón. Frótese las agustin enjabonadas, entrelazando los dedos. Lávese el frente y el dorso de cada mano, y entre los dedos. Use los dedos de isabel mano para restregar debajo de las uñas de la otra mano. Lávese vicente al menos 20 segundos. Enjuague con agua corriente caliente vicente varios segundos. Luego séquese las agustin con isabel toalla limpia o isabel toalla de papel. Puede usar un desinfectante para agustin que contenga alcohol, si no hay agua y jabón disponibles. No se toque los ojos, la nariz o la boca sin antes lavarse las agustin. Enseñe a los niños a lavarse las agustin y a usar el desinfectante de agustin.  Lavado de agustin  •Cúbrase al toser o estornudar.[place] guadalupe que las gotitas viajen de usted a los demás. Gire la magnolia y cúbrase la boca y la nariz con un pañuelo. Deseche el pañuelo. Use el ángulo del brazo si no tiene un pañuelo disponible. Luego lávese las agustin con agua y jabón o use un desinfectante de agustin. Gire la dayan y cúbrase si está cerca de alguien que está estornudando o tosiendo. Enséñeles a los niños a cubrirse al toser o estornudar.  •Siga las pautas de distanciamiento social a nivel local, nacional y mundial.El distanciamiento social significa que las personas evitan el contacto físico cercano para que el virus no se propague de isabel persona a otra. Mantenga al menos 6 pies (2 metros) de distancia entre usted y los demás. También mantenga esta distancia de cualquiera que venga a jaquez casa, ankur alguien que bailey isabel entrega.  •Acostúmbrese a no tocarse la mangolia.No se sabe cuánto tiempo puede permanecer el virus en los objetos y las superficies. Si tiene el virus en las agustin, puede transferirlo a los ojos, la nariz o la boca e infectarse. También puede transferirlo a los objetos, las superficies o las personas. Tenga cuidado con lo que toca cuando sale. Por ejemplo, los pasamanos y botones de ascensor. Intente no tocar nada con las agustin descubiertas a menos que sea necesario. Lávese las agustin antes de salir de jaquez casa y cuando regresa.  •Limpie y desinfecte a menudo los objetos y las superficies de alto contacto.Use isabel solución o toallitas desinfectantes. Puede hacer isabel solución diluyendo 4 cucharaditas de lejía en 1 cuarto de galón (4 tazas) de agua. Limpie y desinfecte aunque piense que nadie que viva o haya entrado en jaquez casa esté infectado con el virus. Puede limpiar los objetos con un paño desinfectante antes de llevarlos a jaquez casa. Lávese las augstin después de manipular cualquier cosa que traiga a jaquez casa.  •Bailey que jaquez sistema inmunitario esté lo más saludable posible.Un sistema inmunitario debilitado lo hace más vulnerable al nuevo coronavirus. No hay ninguna vacuna contra la COVID-19 disponible todavía. Las vacunas, ankur la vacuna contra la gripe y la neumonía, pueden ayudar al sistema inmunitario. Jaquez médico le indicará qué vacunas debe recibir y cuándo aplicárselas. Mantenga jaquez sistema inmunitario lo más erin posible. No fume. Consuma alimentos saludables, bailey ejercicio regularmente e intente controlar el estrés. Acuéstese y levántese a la misma hora todos los días.   Alimentos saludables  ¿Cómo sigo las pautas de distanciamiento social para ayudar a reducir el riesgo de COVID-19?Las normas de distanciamiento social nacionales y locales varían. Las reglas pueden cambiar con el tiempo a medida que se levantan las restricciones. Las restricciones pueden volver a aplicarse si se produce un brote en el lugar donde usted vive. Es importante conocer y seguir todas las reglas de distanciamiento social actuales en jaquez área. Las siguientes son reglas generales al respecto:  •Limite los viajes fuera de jaquez casa.Es posible que se le entreguen alimentos, medicinas y otros suministros. Si es posible, bailey que dejen los objetos que le entregan en jaquez davian o en otra área. Intente que nadie le entregue un objeto en mano. Estará tan cerca de la persona que el virus puede propagarse entre ustedes.  •No tenga contacto físico cercano con nadie que no viva en jaquez casa.No le dé la mano, abrace o bese a isabel persona ankur saludo. Párese o camine lo más lejos posible de los demás. Si tiene que usar el transporte público (ankur el autobús o el metro), intente sentarse o pararse lejos de los demás. Puede mantenerse conectado de forma arroyo con los demás a través de llamadas telefónicas, mensajes de correo electrónico, sitios web de medios sociales y videochats. Verifique cómo están las personas que pueden tener dificultades para distanciarse socialmente, o que viven solas. Pregunte a los administradores de los asilos de ancianos o de las instalaciones de cuidados a mary ann plazo cómo puede comunicarse con seguridad con alguien que vive allí.  •Use un tapabocas de perfecto cuando esté cerca de otras personas que no viven en jaquez casa.Los tapabocas ayudan evitar que el virus se propague a otras personas en las gotitas. Puede usar un tapabocas transparente si alguien necesita leer jie labios. [place] es un tapabocas con un plástico sobre el área de la boca para que se puedan bruno los labios. No utilice tapabocas que tengan válvulas de respiración o respiraderos. El virus puede salir por la válvula o el respiradero y contagiar a otros. No se quite el tapabocas para hablar, toser o estornudar. No utilice tapabocas en niños menores de 2 años ni en personas que tengan problemas respiratorios o no puedan quitárselo  •Permita que solo los profesionales médicos u otros profesionales ingresen a jaquez casa.Use el tapabocas y recuérdeles a los profesionales que usen un tapabocas. Recuérdeles que se laven las agustin cuando lleguen y antes de irse. No deje entrar a nadie que no viva en jaquez casa, aunque no esté enfermo. Isabel persona puede contagiar el virus a otros antes de que comiencen los síntomas de COVID-19. Algunas personas ni siquiera desarrollan síntomas. Los niños suelen tener síntomas leves o ningún síntoma. Puede ser difícil decirle a un tata que no lo abrace ni lo bese. Explíquele que así es ankur puede ayudarlo a mantenerse saludable.  •No vaya a la casa de otra persona, a menos que sea necesario.No vaya de visita, aunque la persona esté ninfa. Vaya solo si necesita ayudarla. Asegúrese de que ambos usen un tapabocas mientras esté allí.  •Evite las grandes reuniones y las multitudes.Las reuniones o multitudes de 10 o más individuos pueden hacer que el virus se propague. Por ejemplo, las reuniones incluyen fiestas, eventos deportivos, servicios religiosos y conferencias. Se pueden formar multitudes en las playas, los parques y las atracciones turísticas. Protéjase manteniéndose alejado de las grandes reuniones y multitudes.  •Pregunte a jaquez médico de qué otra forma puede tener las citas.Es posible que pueda tener citas sin tener que ir al consultorio del médico. Algunos médicos ofrecen citas por teléfono, video u otros tipos de citas. También puede obtener recetas de jie medicamentos para varios meses de isabel vez.  •Manténgase a armani si debe que salir a trabajar.Es posible que tenga un trabajo que solo se puede hacer fuera de jaquez casa. Mantenga la distancia física entre usted y los demás trabajadores tanto ankur sea posible. Siga las reglas de jaquez empleador para que todos estén a armani.  ¿Qué susan hacer si tengo COVID-19 y me estoy recuperando en casa?Los médicos le darán instrucciones específicas que debe seguir. Las siguientes son pautas generales para recordarle cómo mantener a los demás a armani hasta que usted esté vazquez:   •Lávese las agustin frecuentemente.Use agua y jabón tanto ankur sea posible. Puede usar un desinfectante para agustin que contenga alcohol, si no hay agua y jabón disponibles. No comparta toallas con nadie. Si usa toallas de papel, deséchelas en un cubo de basura recubierto que se guarda en jaquez habitación o área. Use un cubo de basura cubierto, si es posible.  •No salga de jaquez casa a menos que sea necesario.Es posible que tenga que ir al consultorio de jaquez médico para hacerse chequeos o para resurtir isabel receta. No llegue al consultorio del médico sin isabel yasir. Los médicos tienen que hacer que jie consultorios pedro seguros para el personal y otros pacientes.  •No entre en contacto físico cercano con nadie, armani que sea necesario.Solo tenga un contacto físico cercano con isabel persona que lo cuide directamente, o con un bebé o tata que deba cuidar. Los miembros de la priyanka y los amigos no deben visitarlo. Si es posible, quédese en un área o habitación separada de jaquez casa si vive con otras personas. Nadie debe entrar en el área o en la habitación excepto para brindarle cuidados. Puede visitar a los demás por teléfono, videochat, correo electrónico o sistemas similares. Es importante mantenerse conectado con los demás en jaquez curry mientras se recupera.  •Use un tapabocas mientras haya otras personas cerca de usted.[place] puede ayudar a evitar que las gotitas propaguen el virus cuando usted habla, estornuda o tose. Póngase el tapabocas antes de que la persona entre en jaquez habitación o área. Recuérdele a la persona que se cubra la nariz y la boca antes de entrar a brindarle cuidados.  •No comparta artículos.No comparta platos, toallas ni otros artículos con nadie. Los artículos deben ser lavados después de usarlos.  •Proteja a jaquez bebé.Lávese las agustin con agua y jabón con frecuencia vicente todo el día. Use un tapabocas mientras amamanta o mientras se extrae o se saca la leche materna. Si es posible, pídale a alguien que esté vazquez que cuide de jaquez bebé. Puede poner la leche materna en biberones para que la persona la use, si es necesario. Hable con jaquez médico si tiene preguntas o inquietudes acerca de cómo cuidar o vincularse con jaquez bebé. También le dirá cuándo debe traer a jaquez bebé para los chequeos y las vacunas. También le dirá qué hacer si amanda que jaquez bebé está infectado con el nuevo virus.      •No manipule animales vivos.Hasta que se sepa más, es mejor no tocar, jugar o manipular animales vivos. Algunos animales, incluyendo las mascotas, ribera sido infectados con el nuevo coronavirus. No manipule ni cuide animales hasta que esté vazquez. El cuidado incluye alimentar, acariciar y abrazar a jaquez mascota. No deje que jaquez mascota lo lama o comparta jaquez comida. Pídale a alguien que no esté infectado que cuide de jaquez mascota, si es posible. Si debe cuidar a isabel mascota, usa un tapabocas. Lávese las agustin antes y después de cuidar a jaquez mascota.  •Siga las instrucciones de jaquez médico para estar cerca de los demás después de recuperarse.Deberá esperar al menos 10 días después de la aparición de los síntomas. Entonces deberá pasar 24 horas sin fiebre sin recibir medicamentos para la fiebre, y sin otros síntomas. La pérdida del sentido del gusto o el olfato puede continuar vicente varios meses. Se considera que está vazquez estar cerca de otros si [place] es el único síntoma. No se sabe con certeza si isabel persona recuperada puede transmitir el virus a otros, ni por cuánto tiempo. Jaquez médico puede darle instrucciones, ankur continuar con el distanciamiento social o usar un tapabocas cuando esté cerca de otras personas.  ¿Cómo susan cuidar a alguien que tiene COVID-19?Si la persona vive en otro hogar, coordine un tiempo para brindar cuidados. Recuerde llevar algunos pares de guantes desechables y un tapabocas. Las siguientes son las pautas generales para ayudarle a cuidar de forma arroyo a cualquier persona que tenga COVID-19:  •Lávese las agustin frecuentemente.Lávese antes y después de entrar en la casa, área o habitación de la persona. Deseche las toallas de papel en un cubo de basura recubierto que tenga isabel tapa, si es posible.  •No permita que otros se acerquen a la persona.Nadie debe ingresar a la casa de la persona a menos que sea necesario. De ser posible, la persona debe estar en un área o habitación separada si vive con otras personas. Mantenga la davian de la habitación cerrada a menos que necesite entrar o salir. Bailey que otras personas llamen, charlen por video o envíen un correo electrónico a la persona si se siente lo suficientemente vazquez. La persona puede sentirse ninfa si se la mantiene separada vicente un mary ann período de tiempo. La comunicación arroyo puede ayudar a esta persona a mantenerse en contacto con jaquez priyanka y amigos.  •Asegúrese de que la habitación de la persona tenga un buen flujo de aire.Puede abrir la ventana si el clima lo permite. También se puede encender el aire acondicionado para ayudar a que el aire se mueva.  •Comuníquese con la persona antes de entrar para brindarle cuidados.Asegúrese de que la persona use un tapabocas. Recuérdele que se lave las agustin con agua y jabón. Puede usar un desinfectante para agustin que contenga alcohol, si no hay agua y jabón disponibles. Colóquese el tapabocas antes de entrar al lugar a brindar cuidados.  •Use guantes mientras crystal cuidados y limpia.Limpie los objetos que la persona usa a menudo. Limpie las encimeras, las superficies de cocción y los frentes y el interior del microondas y el refrigerador. Limpie la ducha, el sanitario, el área alrededor del sanitario, el lavabo, el área alrededor del lavabo y los grifos. Junte la ropa sucia o la ropa de cama. Lave y seque los artículos con el agua más caliente que permita la perfecto. Lave los platos y utensilios usados en [place] y jabonosa o en un lavavajillas.  •Todo lo que deseche debe ir a un cubo de basura recubierto.Cuando necesite vaciar la basura, cierre el extremo abierto de la cubierta y átela. [place] ayuda a evitar que los artículos en los que está el virus se salgan de la basura. Quítese los guantes y deséchelos. Lávese las agustin.      ¿Dónde puedo obtener más información?  •Centers for Disease Control and Prevention  [address] [place] [id number]  Phone: [phone]  Web Address: http://www.cdc.gov      ¿Qué susan hacer si pienso que yo o alguien que conozco está infectado?Bailey lo siguiente para proteger a otras personas:   •Si se requiere atención de emergencia,avise al operador de la posible infección, o llame antes y avise al servicio de urgencias.  •Llame a un médicopara recibir instrucciones si los síntomas son leves. Cualquier persona que pueda estar infectada no debe llegar sin llamar elizabeth. El médico deberá proteger a los miembros del personal y a otros pacientes.  •La persona que puede estar infectada debe usar un tapabocasmientras reciben atención médica. [place] ayudará a reducir el riesgo de infectar a otras personas. Nadie que sea claus de 2 años, que tenga problemas respiratorios o que no pueda quitárselo debe usar un tapabocas. El médico puede darle instrucciones para cualquier persona que no pueda usar un tapabocas.      Llame al número local de emergencias (911 en los [place]) o al departamento de emergencias si:  •Usted tiene dificultad para respirar o falta de aliento mientras descansa.  •Usted siente presión o dolor en el pecho que dura más de 5 minutos.  •Usted tiene confusión o es difícil despertarlo.  •Jie labios o magnolia están azules.  •Usted tiene fiebre de 104 ºF (40 °C) o más.  ¿Cuándo susan llamar a mi médico?  •No tiene síntomas de COVID-19 elena tuvo contacto físico cercano dentro de los 14 días con alguien que chelita positivo.  •Usted tiene preguntas o inquietudes acerca de jaquez condición o cuidado.      ACUERDOS SOBRE JAQUEZ CUIDADO:  Usted tiene el derecho de ayudar a planear jaquez cuidado. Aprenda todo lo que pueda sobre jaquez condición y ankur darle tratamiento. Discuta jie opciones de tratamiento con jie médicos para decidir el cuidado que usted desea recibir. Usted siempre tiene el derecho de rechazar el tratamiento.

## 2021-03-29 NOTE — ED PROVIDER NOTE - ATTENDING CONTRIBUTION TO CARE
Attending Statement: I have personally seen and examined this patient. I have fully participated in the care of this patient. I have reviewed all pertinent clinical information, including history physical exam, plan and the Resident's note and agree except as noted  76yo F hx of breast ca, dx COVID on 3-22-21 from home co of diffuse body ache, fever at home. Abdominal pain, located upper abdomen, associated w decrease po intake, nausea, NB vomit. Denies SOB and chest pain. Describe generalized weakness. no focal weakness or numbness.   Vital signs noted. nontoxic female. non icteric. no juandice. no WOB, no retractions. pulse ox 98RA HR 88 soft nondistended tender mostly in the upper abdomen. no murphys sign. no guarding. no cvat. no pedal edema. no calf tenderness. normal pulses bilateral feet.  plan ekg, labs, ct abdomen-pelvis, IVF< pain med, not requiring oxy at this time.

## 2021-03-29 NOTE — ED PROVIDER NOTE - NS ED ROS FT
GENERAL: No fever or chills, EYES: no change in vision, HEENT: no trouble swallowing or speaking, CARDIAC: no chest pain, PULMONARY: no cough or SOB, GI: +abdominal pain, +nausea, +vomiting, +diarrhea or constipation, : No changes in urination, SKIN: no rashes, NEURO: no headache,  MSK: No joint pain ~Cornelius Hood M.D. Resident

## 2021-03-30 ENCOUNTER — INPATIENT (INPATIENT)
Facility: HOSPITAL | Age: 76
LOS: 6 days | Discharge: HOME HEALTH | DRG: 177 | End: 2021-04-06
Attending: INTERNAL MEDICINE | Admitting: INTERNAL MEDICINE
Payer: MEDICARE

## 2021-03-30 VITALS
DIASTOLIC BLOOD PRESSURE: 84 MMHG | SYSTOLIC BLOOD PRESSURE: 131 MMHG | OXYGEN SATURATION: 90 % | HEART RATE: 87 BPM | WEIGHT: 175.93 LBS | TEMPERATURE: 99 F | HEIGHT: 59 IN | RESPIRATION RATE: 20 BRPM

## 2021-03-30 DIAGNOSIS — R09.02 HYPOXEMIA: ICD-10-CM

## 2021-03-30 LAB
ALBUMIN SERPL ELPH-MCNC: 3.2 G/DL — LOW (ref 3.3–5)
ALP SERPL-CCNC: 63 U/L — SIGNIFICANT CHANGE UP (ref 40–120)
ALT FLD-CCNC: 33 U/L — SIGNIFICANT CHANGE UP (ref 10–45)
ANION GAP SERPL CALC-SCNC: 16 MMOL/L — SIGNIFICANT CHANGE UP (ref 5–17)
AST SERPL-CCNC: 50 U/L — HIGH (ref 10–40)
BASOPHILS # BLD AUTO: 0.01 K/UL — SIGNIFICANT CHANGE UP (ref 0–0.2)
BASOPHILS NFR BLD AUTO: 0.1 % — SIGNIFICANT CHANGE UP (ref 0–2)
BILIRUB SERPL-MCNC: 0.4 MG/DL — SIGNIFICANT CHANGE UP (ref 0.2–1.2)
BUN SERPL-MCNC: 12 MG/DL — SIGNIFICANT CHANGE UP (ref 7–23)
CALCIUM SERPL-MCNC: 9 MG/DL — SIGNIFICANT CHANGE UP (ref 8.4–10.5)
CHLORIDE SERPL-SCNC: 97 MMOL/L — SIGNIFICANT CHANGE UP (ref 96–108)
CO2 SERPL-SCNC: 19 MMOL/L — LOW (ref 22–31)
CREAT SERPL-MCNC: 0.46 MG/DL — LOW (ref 0.5–1.3)
CRP SERPL-MCNC: 180 MG/L — HIGH (ref 0–4)
D DIMER BLD IA.RAPID-MCNC: 260 NG/ML DDU — HIGH
EOSINOPHIL # BLD AUTO: 0 K/UL — SIGNIFICANT CHANGE UP (ref 0–0.5)
EOSINOPHIL NFR BLD AUTO: 0 % — SIGNIFICANT CHANGE UP (ref 0–6)
GAS PNL BLDV: SIGNIFICANT CHANGE UP
GLUCOSE SERPL-MCNC: 102 MG/DL — HIGH (ref 70–99)
HCT VFR BLD CALC: 46.4 % — HIGH (ref 34.5–45)
HGB BLD-MCNC: 15.2 G/DL — SIGNIFICANT CHANGE UP (ref 11.5–15.5)
IMM GRANULOCYTES NFR BLD AUTO: 0.8 % — SIGNIFICANT CHANGE UP (ref 0–1.5)
LYMPHOCYTES # BLD AUTO: 0.74 K/UL — LOW (ref 1–3.3)
LYMPHOCYTES # BLD AUTO: 8.2 % — LOW (ref 13–44)
MCHC RBC-ENTMCNC: 28.1 PG — SIGNIFICANT CHANGE UP (ref 27–34)
MCHC RBC-ENTMCNC: 32.8 GM/DL — SIGNIFICANT CHANGE UP (ref 32–36)
MCV RBC AUTO: 85.9 FL — SIGNIFICANT CHANGE UP (ref 80–100)
MONOCYTES # BLD AUTO: 0.6 K/UL — SIGNIFICANT CHANGE UP (ref 0–0.9)
MONOCYTES NFR BLD AUTO: 6.7 % — SIGNIFICANT CHANGE UP (ref 2–14)
NEUTROPHILS # BLD AUTO: 7.56 K/UL — HIGH (ref 1.8–7.4)
NEUTROPHILS NFR BLD AUTO: 84.2 % — HIGH (ref 43–77)
NRBC # BLD: 0 /100 WBCS — SIGNIFICANT CHANGE UP (ref 0–0)
PLATELET # BLD AUTO: 218 K/UL — SIGNIFICANT CHANGE UP (ref 150–400)
POTASSIUM SERPL-MCNC: 5 MMOL/L — SIGNIFICANT CHANGE UP (ref 3.5–5.3)
POTASSIUM SERPL-SCNC: 5 MMOL/L — SIGNIFICANT CHANGE UP (ref 3.5–5.3)
PROCALCITONIN SERPL-MCNC: 0.28 NG/ML — HIGH (ref 0.02–0.1)
PROT SERPL-MCNC: 7.6 G/DL — SIGNIFICANT CHANGE UP (ref 6–8.3)
RBC # BLD: 5.4 M/UL — HIGH (ref 3.8–5.2)
RBC # FLD: 14.6 % — HIGH (ref 10.3–14.5)
SODIUM SERPL-SCNC: 132 MMOL/L — LOW (ref 135–145)
WBC # BLD: 8.98 K/UL — SIGNIFICANT CHANGE UP (ref 3.8–10.5)
WBC # FLD AUTO: 8.98 K/UL — SIGNIFICANT CHANGE UP (ref 3.8–10.5)

## 2021-03-30 PROCEDURE — 99285 EMERGENCY DEPT VISIT HI MDM: CPT

## 2021-03-30 PROCEDURE — 93010 ELECTROCARDIOGRAM REPORT: CPT

## 2021-03-30 PROCEDURE — 71045 X-RAY EXAM CHEST 1 VIEW: CPT | Mod: 26

## 2021-03-30 RX ORDER — CIPROFLOXACIN LACTATE 400MG/40ML
0 VIAL (ML) INTRAVENOUS
Qty: 0 | Refills: 0 | DISCHARGE

## 2021-03-30 RX ORDER — ACETAMINOPHEN 500 MG
975 TABLET ORAL ONCE
Refills: 0 | Status: COMPLETED | OUTPATIENT
Start: 2021-03-30 | End: 2021-03-30

## 2021-03-30 RX ORDER — REMDESIVIR 5 MG/ML
200 INJECTION INTRAVENOUS EVERY 24 HOURS
Refills: 0 | Status: COMPLETED | OUTPATIENT
Start: 2021-03-31 | End: 2021-03-31

## 2021-03-30 RX ORDER — DEXAMETHASONE 0.5 MG/5ML
6 ELIXIR ORAL ONCE
Refills: 0 | Status: COMPLETED | OUTPATIENT
Start: 2021-03-30 | End: 2021-03-30

## 2021-03-30 RX ORDER — CEFTRIAXONE 500 MG/1
1000 INJECTION, POWDER, FOR SOLUTION INTRAMUSCULAR; INTRAVENOUS ONCE
Refills: 0 | Status: COMPLETED | OUTPATIENT
Start: 2021-03-30 | End: 2021-03-30

## 2021-03-30 RX ORDER — SODIUM CHLORIDE 9 MG/ML
500 INJECTION INTRAMUSCULAR; INTRAVENOUS; SUBCUTANEOUS ONCE
Refills: 0 | Status: COMPLETED | OUTPATIENT
Start: 2021-03-30 | End: 2021-03-30

## 2021-03-30 RX ORDER — MIRABEGRON 50 MG/1
0 TABLET, EXTENDED RELEASE ORAL
Qty: 0 | Refills: 0 | DISCHARGE

## 2021-03-30 RX ORDER — ENOXAPARIN SODIUM 100 MG/ML
40 INJECTION SUBCUTANEOUS DAILY
Refills: 0 | Status: DISCONTINUED | OUTPATIENT
Start: 2021-03-30 | End: 2021-03-31

## 2021-03-30 RX ORDER — REMDESIVIR 5 MG/ML
100 INJECTION INTRAVENOUS EVERY 24 HOURS
Refills: 0 | Status: DISCONTINUED | OUTPATIENT
Start: 2021-04-01 | End: 2021-04-05

## 2021-03-30 RX ORDER — REMDESIVIR 5 MG/ML
INJECTION INTRAVENOUS
Refills: 0 | Status: DISCONTINUED | OUTPATIENT
Start: 2021-03-31 | End: 2021-04-05

## 2021-03-30 RX ADMIN — Medication 6 MILLIGRAM(S): at 18:00

## 2021-03-30 RX ADMIN — Medication 975 MILLIGRAM(S): at 22:37

## 2021-03-30 RX ADMIN — CEFTRIAXONE 100 MILLIGRAM(S): 500 INJECTION, POWDER, FOR SOLUTION INTRAMUSCULAR; INTRAVENOUS at 18:00

## 2021-03-30 RX ADMIN — ENOXAPARIN SODIUM 40 MILLIGRAM(S): 100 INJECTION SUBCUTANEOUS at 23:47

## 2021-03-30 RX ADMIN — SODIUM CHLORIDE 500 MILLILITER(S): 9 INJECTION INTRAMUSCULAR; INTRAVENOUS; SUBCUTANEOUS at 18:00

## 2021-03-30 NOTE — ED PROVIDER NOTE - NS ED ROS FT
Constitutional: see hpi   Eyes: No visual changes, eye pain or redness  HEENT: No throat pain, ear pain, nasal pain. No nose bleeding.  CV: No chest pain or lower extremity edema  Resp: see hpi  GI: No abd pain. No nausea or vomiting. No diarrhea. No constipation.   : No dysuria, hematuria.   MSK: No musculoskeletal pain  Skin: No rash  Neuro: see hpi

## 2021-03-30 NOTE — H&P ADULT - NSHPPHYSICALEXAM_GEN_ALL_CORE
T(F): 100.2 (03-30-21 @ 22:01), Max: 100.3 (03-30-21 @ 18:03)  HR: 81 (03-30-21 @ 22:01) (81 - 87)  BP: 137/63 (03-30-21 @ 22:01) (131/84 - 148/77)  RR: 20 (03-30-21 @ 22:01) (19 - 20)  SpO2: 96% (03-30-21 @ 22:01) (90% - 97%)    PHYSICAL EXAM:  GENERAL: NAD, well-developed  HEAD:  Atraumatic, Normocephalic  EYES: EOMI, PERRLA, conjunctiva and sclera clear  NECK: Supple, No JVD  CHEST/LUNG: Clear to auscultation bilaterally; No wheeze  HEART: Regular rate and rhythm; No murmurs, rubs, or gallops  ABDOMEN: Soft, Nontender, Nondistended; Bowel sounds present  EXTREMITIES:  2+ Peripheral Pulses, No clubbing, cyanosis, or edema  PSYCH: AAOx3  NEUROLOGY: non-focal  SKIN: No rashes or lesions

## 2021-03-30 NOTE — ED PROVIDER NOTE - PHYSICAL EXAMINATION
A&Ox3, NAD. NCAT. PERRL, EOMI. Neck supple, no LAD. Lungs CTAB. Slightly tachypneic with ambulation O2 saturations down to 89% with exertion. no retractions. +S1S2, RRR, No m/r/g. Abd soft, NT/ND, +BS, no rebound or guarding. Extremities: cap refill <2, pulses in distal extremities 4+, no edema. Skin without rash. CN II-XII intact. Strength 5/5 UE/LE. Sensations intact throughout. Gait steady.

## 2021-03-30 NOTE — ED ADULT NURSE NOTE - OBJECTIVE STATEMENT
76 yo female COVID+ yesterday at [facility] presents to ED From home c/o weakness, diarrhea, fatigue x8 days and hypoxia to 89% upon ambulation. Patient reports worsening weakness and lethargy. Patient reports she had a negative CT yesterday, was able to tolerate PO intake, dx with UTI on abx, however is weaker today. Upon arrival, patient is SOB with talking and exertion, hypoxic to 89% on room air. Patient CP, nausea/vomiting, falls/loc. Patient A&OX3, breathing spontaneously on 4L O2, airway patent, bl clear lungs, abdomen nontender, +pulses, cap refill <2 seconds. Patient resting in bed, side rails up, plan of care explained. MD at bedside, cardiac monitor in place.

## 2021-03-30 NOTE — ED ADULT NURSE REASSESSMENT NOTE - NS ED NURSE REASSESS COMMENT FT1
Patient made RN aware she had diarrhea accident on bed, RN changed patient w/ assistance, call bell within reach.

## 2021-03-30 NOTE — ED PROVIDER NOTE - PROGRESS NOTE DETAILS
spoke with transfer center which does not have any more covid medicine beds for transfer. -Rashida Bush PA-C

## 2021-03-30 NOTE — ED ADULT NURSE NOTE - NSIMPLEMENTINTERV_GEN_ALL_ED
Implemented All Fall Risk Interventions:  [place] to call system. Call bell, personal items and telephone within reach. Instruct patient to call for assistance. Room bathroom lighting operational. Non-slip footwear when patient is off stretcher. Physically safe environment: no spills, clutter or unnecessary equipment. Stretcher in lowest position, wheels locked, appropriate side rails in place. Provide visual cue, wrist band, yellow gown, etc. Monitor gait and stability. Monitor for mental status changes and reorient to person, place, and time. Review medications for side effects contributing to fall risk. Reinforce activity limits and safety measures with patient and family.

## 2021-03-30 NOTE — ED PROVIDER NOTE - OBJECTIVE STATEMENT
75yF h/o breast cancer, Covid+ (3/22/21) presents with diffuse body aches, fevers, n/v, abdominal pain, unable to tolerate PO x5 days here for worsening weakness and lethargy. Pt was at [facility] yesterday with same symptoms, had CT A/P which was negative for acute pathology, was able to tolerate PO, had UTI and d/cd on abx, today feels weaker today. Denies worsening shortness of breath or difficulty breathing, no fevers at home today, abd pain not any worse, no chest pain, no back pain.    HPI obtained with  # [id number] 75yF h/o breast cancer, Covid+ (3/22/21) presents with diffuse body aches, fevers, n/v, abdominal pain, unable to tolerate PO x5 days here for worsening weakness and lethargy. Pt was at [facility] yesterday with same symptoms, had CT A/P which was negative for acute pathology, was able to tolerate PO, had UTI and d/cd on abx, feels weaker today. Denies worsening shortness of breath or difficulty breathing, no fevers at home today, abd pain not any worse, no chest pain, no back pain.    HPI obtained with  # [id number] 75yF h/o breast cancer, Covid+ (3/22/21) presents with diffuse body aches, fevers, n/v, abdominal pain, unable to tolerate PO x5 days here for worsening weakness and lethargy. Pt was at [facility] yesterday with same symptoms, had CT A/P which was negative for acute pathology, was able to tolerate PO, had UTI and d/cd on abx, feels weaker today. Denies worsening shortness of breath or difficulty breathing since d/c yesterday, no fevers at home today, abd pain not any worse, no chest pain, no back pain.    HPI obtained with  # [id number]

## 2021-03-30 NOTE — H&P ADULT - HISTORY OF PRESENT ILLNESS
75yF h/o breast cancer, Covid+ (3/22/21) presents with diffuse body aches, fevers, n/v, abdominal pain, unable to tolerate PO x5 days here for worsening weakness and lethargy. Pt was at [facility] yesterday with same symptoms, had CT A/P which was negative for acute pathology, was able to tolerate PO, had UTI and d/cd on abx, feels weaker today. Denies worsening shortness of breath or difficulty breathing since d/c yesterday, no fevers at home today, abd pain not any worse, no chest pain, no back pain.

## 2021-03-31 LAB
-  AMIKACIN: SIGNIFICANT CHANGE UP
-  AMOXICILLIN/CLAVULANIC ACID: SIGNIFICANT CHANGE UP
-  AMPICILLIN/SULBACTAM: SIGNIFICANT CHANGE UP
-  AMPICILLIN: SIGNIFICANT CHANGE UP
-  AZTREONAM: SIGNIFICANT CHANGE UP
-  CEFAZOLIN: SIGNIFICANT CHANGE UP
-  CEFEPIME: SIGNIFICANT CHANGE UP
-  CEFOXITIN: SIGNIFICANT CHANGE UP
-  CEFTRIAXONE: SIGNIFICANT CHANGE UP
-  CIPROFLOXACIN: SIGNIFICANT CHANGE UP
-  ERTAPENEM: SIGNIFICANT CHANGE UP
-  GENTAMICIN: SIGNIFICANT CHANGE UP
-  IMIPENEM: SIGNIFICANT CHANGE UP
-  LEVOFLOXACIN: SIGNIFICANT CHANGE UP
-  MEROPENEM: SIGNIFICANT CHANGE UP
-  NITROFURANTOIN: SIGNIFICANT CHANGE UP
-  PIPERACILLIN/TAZOBACTAM: SIGNIFICANT CHANGE UP
-  TIGECYCLINE: SIGNIFICANT CHANGE UP
-  TOBRAMYCIN: SIGNIFICANT CHANGE UP
-  TRIMETHOPRIM/SULFAMETHOXAZOLE: SIGNIFICANT CHANGE UP
ALBUMIN SERPL ELPH-MCNC: 3.2 G/DL — LOW (ref 3.3–5)
ALP SERPL-CCNC: 64 U/L — SIGNIFICANT CHANGE UP (ref 40–120)
ALT FLD-CCNC: 34 U/L — SIGNIFICANT CHANGE UP (ref 10–45)
AST SERPL-CCNC: 40 U/L — SIGNIFICANT CHANGE UP (ref 10–40)
BILIRUB DIRECT SERPL-MCNC: <0.1 MG/DL — SIGNIFICANT CHANGE UP (ref 0–0.2)
BILIRUB INDIRECT FLD-MCNC: >0.2 MG/DL — SIGNIFICANT CHANGE UP (ref 0.2–1)
BILIRUB SERPL-MCNC: 0.3 MG/DL — SIGNIFICANT CHANGE UP (ref 0.2–1.2)
COVID-19 SPIKE DOMAIN AB INTERP: NEGATIVE — SIGNIFICANT CHANGE UP
COVID-19 SPIKE DOMAIN ANTIBODY RESULT: 0.4 U/ML — SIGNIFICANT CHANGE UP
CREAT SERPL-MCNC: 0.47 MG/DL — LOW (ref 0.5–1.3)
CULTURE RESULTS: SIGNIFICANT CHANGE UP
FERRITIN SERPL-MCNC: 747 NG/ML — HIGH (ref 15–150)
INR BLD: 1.19 RATIO — HIGH (ref 0.88–1.16)
METHOD TYPE: SIGNIFICANT CHANGE UP
ORGANISM # SPEC MICROSCOPIC CNT: SIGNIFICANT CHANGE UP
ORGANISM # SPEC MICROSCOPIC CNT: SIGNIFICANT CHANGE UP
PROT SERPL-MCNC: 7.3 G/DL — SIGNIFICANT CHANGE UP (ref 6–8.3)
PROTHROM AB SERPL-ACNC: 14.2 SEC — HIGH (ref 10.6–13.6)
SARS-COV-2 IGG+IGM SERPL QL IA: 0.4 U/ML — SIGNIFICANT CHANGE UP
SARS-COV-2 IGG+IGM SERPL QL IA: NEGATIVE — SIGNIFICANT CHANGE UP
SPECIMEN SOURCE: SIGNIFICANT CHANGE UP

## 2021-03-31 PROCEDURE — 71275 CT ANGIOGRAPHY CHEST: CPT | Mod: 26

## 2021-03-31 RX ORDER — ENOXAPARIN SODIUM 100 MG/ML
40 INJECTION SUBCUTANEOUS EVERY 12 HOURS
Refills: 0 | Status: DISCONTINUED | OUTPATIENT
Start: 2021-03-31 | End: 2021-04-06

## 2021-03-31 RX ORDER — CHOLECALCIFEROL (VITAMIN D3) 125 MCG
0 CAPSULE ORAL
Qty: 0 | Refills: 0 | DISCHARGE

## 2021-03-31 RX ORDER — ANASTROZOLE 1 MG/1
1 TABLET ORAL
Qty: 0 | Refills: 0 | DISCHARGE

## 2021-03-31 RX ORDER — ZINC SULFATE TAB 220 MG (50 MG ZINC EQUIVALENT) 220 (50 ZN) MG
0 TAB ORAL
Qty: 0 | Refills: 0 | DISCHARGE

## 2021-03-31 RX ORDER — ONDANSETRON 8 MG/1
1 TABLET, FILM COATED ORAL
Qty: 0 | Refills: 0 | DISCHARGE

## 2021-03-31 RX ORDER — ASCORBIC ACID 60 MG
0 TABLET,CHEWABLE ORAL
Qty: 0 | Refills: 0 | DISCHARGE

## 2021-03-31 RX ORDER — DEXAMETHASONE 0.5 MG/5ML
6 ELIXIR ORAL DAILY
Refills: 0 | Status: DISCONTINUED | OUTPATIENT
Start: 2021-03-31 | End: 2021-04-06

## 2021-03-31 RX ORDER — POTASSIUM CHLORIDE 20 MEQ
20 PACKET (EA) ORAL
Refills: 0 | Status: COMPLETED | OUTPATIENT
Start: 2021-03-31 | End: 2021-03-31

## 2021-03-31 RX ORDER — CEFTRIAXONE 500 MG/1
1000 INJECTION, POWDER, FOR SOLUTION INTRAMUSCULAR; INTRAVENOUS EVERY 24 HOURS
Refills: 0 | Status: DISCONTINUED | OUTPATIENT
Start: 2021-03-31 | End: 2021-04-02

## 2021-03-31 RX ADMIN — Medication 20 MILLIEQUIVALENT(S): at 19:10

## 2021-03-31 RX ADMIN — Medication 975 MILLIGRAM(S): at 01:00

## 2021-03-31 RX ADMIN — REMDESIVIR 500 MILLIGRAM(S): 5 INJECTION INTRAVENOUS at 10:59

## 2021-03-31 RX ADMIN — CEFTRIAXONE 100 MILLIGRAM(S): 500 INJECTION, POWDER, FOR SOLUTION INTRAMUSCULAR; INTRAVENOUS at 21:30

## 2021-03-31 RX ADMIN — ENOXAPARIN SODIUM 40 MILLIGRAM(S): 100 INJECTION SUBCUTANEOUS at 12:06

## 2021-03-31 RX ADMIN — Medication 20 MILLIEQUIVALENT(S): at 14:00

## 2021-03-31 RX ADMIN — ENOXAPARIN SODIUM 40 MILLIGRAM(S): 100 INJECTION SUBCUTANEOUS at 19:09

## 2021-03-31 RX ADMIN — Medication 6 MILLIGRAM(S): at 17:27

## 2021-03-31 RX ADMIN — Medication 20 MILLIEQUIVALENT(S): at 16:00

## 2021-03-31 NOTE — CONSULT NOTE ADULT - SUBJECTIVE AND OBJECTIVE BOX
PULMONARY CONSULT  Juarez Krueger MD  [phone]    Initial HPI on admission:  HPI:  75yF h/o breast cancer, Covid+ (3/22/21) presents with diffuse body aches, fevers, n/v, abdominal pain, unable to tolerate PO x5 days here for worsening weakness and lethargy. Pt was at [facility] yesterday with same symptoms, had CT A/P which was negative for acute pathology, was able to tolerate PO, had UTI and d/cd on abx, feels weaker today. Denies worsening shortness of breath or difficulty breathing since d/c yesterday, no fevers at home today, abd pain not any worse, no chest pain, no back pain.           PAST MEDICAL & SURGICAL HISTORY:  Malignant Neoplasm of Corpus Uteri    History of Appendectomy    S/P BALDOMERO-BSO  4/21/11      Allergies    sulfa drugs (Unknown)    Intolerances      FAMILY HISTORY:    Social history:     ROS      Medications:  MEDICATIONS  (STANDING):  enoxaparin Injectable 40 milliGRAM(s) SubCutaneous daily  remdesivir  IVPB   IV Intermittent     MEDICATIONS  (PRN):    Vital Signs Last 24 Hrs  T(C): 36.7 (31 Mar 2021 05:22), Max: 37.9 (30 Mar 2021 18:03)  T(F): 98 (31 Mar 2021 05:22), Max: 100.3 (30 Mar 2021 18:03)  HR: 68 (31 Mar 2021 05:22) (68 - 87)  BP: 127/78 (31 Mar 2021 05:22) (127/78 - 148/77)  BP(mean): 99 (30 Mar 2021 18:03) (99 - 99)  RR: 19 (31 Mar 2021 05:22) (19 - 20)  SpO2: 96% (31 Mar 2021 05:22) (90% - 97%)    LABS:                        15.2   8.98  )-----------( 218      ( 30 Mar 2021 18:25 )             46.4     03-31    x   |  x   |  x   ----------------------------<  x   x    |  x   |  0.47<L>    Ca    9.0      30 Mar 2021 18:25    TPro  7.3  /  Alb  3.2<L>  /  TBili  0.3  /  DBili  <0.1  /  AST  40  /  ALT  34  /  AlkPhos  64  03-31      PT/INR - ( 31 Mar 2021 06:58 )   PT: 14.2 sec;   INR: 1.19 ratio         CULTURES:  Culture Results:   No growth to date. (03-29 @ 12:24)  Culture Results:   No growth to date. (03-29 @ 10:10)  Culture Results:   >100,000 CFU/ml Escherichia coli (03-29 @ 09:31)    Physical Examination:    General: No acute distress.      HEENT: Pupils equal, reactive to light.  Symmetric.    PULM: Clear to auscultation bilaterally, no significant sputum production    CVS: Regular rate and rhythm, no murmurs, rubs, or gallops    ABD: Soft, nondistended, nontender, normoactive bowel sounds, no masses    EXT: No edema, nontender    SKIN: Warm and well perfused, no rashes noted.    NEURO: Alert, oriented, interactive, nonfocal    RADIOLOGY REVIEWED PERSONALLY  CXR:    PROCEDURE DATE:  03/30/2021        INTERPRETATION:  EXAMINATION: XR CHEST URGENT    CLINICAL INDICATION: Shortness of breath, cough, and fever. Positive Covid 19 test.    TECHNIQUE: Single frontal view of the chest was obtained.    COMPARISON: Multiple prior chest x-rays, with the most recent dated 3/29/2021.    FINDINGS:  Cardiomegaly.  Diffuse bilateral patchy opacities.  There is no pneumothorax or pleural effusion.    IMPRESSION:  Diffuse bilateral patchy opacities consistent with known Covid 19. Pulmonary vascular congestion cannot be ruled out as well. Degenerative changes of the thoracic spin    CT Abd:    INTERPRETATION:  CLINICAL INFORMATION: Abdominal pain. Fever. Bodyaches. History of breast cancer.    COMPARISON: CT abdomen pelvis 4/19/2011.    CONTRAST/COMPLICATIONS:  IV Contrast: Omnipaque 350  90 cc administered   10 cc discarded  Oral Contrast: NONE  Complications: None reported at time of study completion    PROCEDURE:  CT of the Abdomen and Pelvis was performed.  Sagittal and coronal reformats were performed.    FINDINGS:  LOWER CHEST: The heart is enlarged. Trace pericardial effusion. Small hiatal hernia. Linear atelectasis in the bilateral lower lobes.    LIVER: Hepatic steatosis.  BILE DUCTS: Normal caliber.  GALLBLADDER: Within normal limits.  SPLEEN: Unchanged low attenuating lesion with peripheral calcification measuring 3.4 cm, likely a calcified hemangioma.  PANCREAS: Within normal limits.  ADRENALS: Within normal limits.  KIDNEYS/URETERS: Stable bilateral subcentimeter hypodense foci that likely represent cysts. No hydronephrosis.    BLADDER: Within normal limits.  REPRODUCTIVE ORGANS: Hysterectomy.    BOWEL: No bowel obstruction. Appendix is not visualized but there are no secondary signs of acute appendicitis. Diverticulosis without evidence of acute diverticulitis.  PERITONEUM: No intra-abdominal fluid collections.  VESSELS: Arterial calcifications. Status post right common iliac vein stent placement that extends into the right external iliac vein  RETROPERITONEUM/LYMPH NODES: No lymphadenopathy.  ABDOMINAL WALL: Within normal limits.  BONES: Degenerative changes.    IMPRESSION:  No bowel obstruction or acute diverticulitis.  No intra-abdominal fluid collections. Trace pericardial effusion.    TTE:     PULMONARY CONSULT  Juarez Krueger MD  [phone]    Initial HPI on admission:  HPI:  75yF h/o breast cancer, Covid+ (3/22/21) presents with diffuse body aches, fevers, n/v, abdominal pain, unable to tolerate PO x5 days here for worsening weakness and lethargy. Pt was at [facility] yesterday with same symptoms, had CT A/P which was negative for acute pathology, was able to tolerate PO, had UTI and d/cd on abx, feels weaker today. Denies worsening shortness of breath or difficulty breathing since d/c yesterday, no fevers at home today, abd pain not any worse, no chest pain, no back pain.     Patient is a non smoker and denies history of COPD asthma.  She is s/p L breast lumpectomy approx 1 yr PTA, followed by RT and ? hormonal rx  She says R breast mamogram is susupicious for malignancy      PAST MEDICAL & SURGICAL HISTORY:  Malignant Neoplasm of Corpus Uteri    History of Appendectomy    S/P BALDOMERO-BSO  4/21/11      Allergies    sulfa drugs (Unknown)    Intolerances      FAMILY HISTORY:    Social history:     ROS      Medications:  MEDICATIONS  (STANDING):  enoxaparin Injectable 40 milliGRAM(s) SubCutaneous daily  remdesivir  IVPB   IV Intermittent     MEDICATIONS  (PRN):    Vital Signs Last 24 Hrs  T(C): 36.7 (31 Mar 2021 05:22), Max: 37.9 (30 Mar 2021 18:03)  T(F): 98 (31 Mar 2021 05:22), Max: 100.3 (30 Mar 2021 18:03)  HR: 68 (31 Mar 2021 05:22) (68 - 87)  BP: 127/78 (31 Mar 2021 05:22) (127/78 - 148/77)  BP(mean): 99 (30 Mar 2021 18:03) (99 - 99)  RR: 19 (31 Mar 2021 05:22) (19 - 20)  SpO2: 96% (31 Mar 2021 05:22) (90% - 97%)    LABS:                        15.2   8.98  )-----------( 218      ( 30 Mar 2021 18:25 )             46.4     03-31    x   |  x   |  x   ----------------------------<  x   x    |  x   |  0.47<L>    Ca    9.0      30 Mar 2021 18:25    TPro  7.3  /  Alb  3.2<L>  /  TBili  0.3  /  DBili  <0.1  /  AST  40  /  ALT  34  /  AlkPhos  64  03-31      PT/INR - ( 31 Mar 2021 06:58 )   PT: 14.2 sec;   INR: 1.19 ratio         CULTURES:  Culture Results:   No growth to date. (03-29 @ 12:24)  Culture Results:   No growth to date. (03-29 @ 10:10)  Culture Results:   >100,000 CFU/ml Escherichia coli (03-29 @ 09:31)    Physical Examination:    General: Non toxic, No acute distress.      HEENT: Pupils equal, reactive to light.  Symmetric.    PULM: Clear without wheeze or rhonchi    CVS: Regular rate and rhythm, no murmurs, rubs, or gallops    ABD: Soft, nondistended, nontender, normoactive bowel sounds, no masses    EXT: No edema, nontender    SKIN: Warm and well perfused, no rashes noted.    NEURO: Alert, oriented, interactive, nonfocal    RADIOLOGY REVIEWED PERSONALLY  CXR:    PROCEDURE DATE:  03/30/2021        INTERPRETATION:  EXAMINATION: XR CHEST URGENT    CLINICAL INDICATION: Shortness of breath, cough, and fever. Positive Covid 19 test.    TECHNIQUE: Single frontal view of the chest was obtained.    COMPARISON: Multiple prior chest x-rays, with the most recent dated 3/29/2021.    FINDINGS:  Cardiomegaly.  Diffuse bilateral patchy opacities.  There is no pneumothorax or pleural effusion.    IMPRESSION:  Diffuse bilateral patchy opacities consistent with known Covid 19. Pulmonary vascular congestion cannot be ruled out as well. Degenerative changes of the thoracic spin    CT Abd:    INTERPRETATION:  CLINICAL INFORMATION: Abdominal pain. Fever. Bodyaches. History of breast cancer.    COMPARISON: CT abdomen pelvis 4/19/2011.    CONTRAST/COMPLICATIONS:  IV Contrast: Omnipaque 350  90 cc administered   10 cc discarded  Oral Contrast: NONE  Complications: None reported at time of study completion    PROCEDURE:  CT of the Abdomen and Pelvis was performed.  Sagittal and coronal reformats were performed.    FINDINGS:  LOWER CHEST: The heart is enlarged. Trace pericardial effusion. Small hiatal hernia. Linear atelectasis in the bilateral lower lobes.    LIVER: Hepatic steatosis.  BILE DUCTS: Normal caliber.  GALLBLADDER: Within normal limits.  SPLEEN: Unchanged low attenuating lesion with peripheral calcification measuring 3.4 cm, likely a calcified hemangioma.  PANCREAS: Within normal limits.  ADRENALS: Within normal limits.  KIDNEYS/URETERS: Stable bilateral subcentimeter hypodense foci that likely represent cysts. No hydronephrosis.    BLADDER: Within normal limits.  REPRODUCTIVE ORGANS: Hysterectomy.    BOWEL: No bowel obstruction. Appendix is not visualized but there are no secondary signs of acute appendicitis. Diverticulosis without evidence of acute diverticulitis.  PERITONEUM: No intra-abdominal fluid collections.  VESSELS: Arterial calcifications. Status post right common iliac vein stent placement that extends into the right external iliac vein  RETROPERITONEUM/LYMPH NODES: No lymphadenopathy.  ABDOMINAL WALL: Within normal limits.  BONES: Degenerative changes.    IMPRESSION:  No bowel obstruction or acute diverticulitis.  No intra-abdominal fluid collections. Trace pericardial effusion.    TTE:

## 2021-03-31 NOTE — CONSULT NOTE ADULT - SUBJECTIVE AND OBJECTIVE BOX
HPI:   Patient is a 75y female who has been feeling unwell for about 10 days-cough, fever, body aches, diarrhea. She had a ct at [facility] recent and no abnormality. She was given keflex for concern of a uti but her ua is negative for pyuria and she has no symptoms. She continued to feel very sick and then got sob over the past couple of days hence came in. She had a sat of about 90 on RA. Remdesivir and decadron were started and today she feels much better. She denies vomiting, dysuria, hematuria, hemoptysis and has no further abdomen pain.     REVIEW OF SYSTEMS:  All other review of systems negative (Comprehensive ROS)    PAST MEDICAL & SURGICAL HISTORY:  Malignant Neoplasm of Corpus Uteri    History of Appendectomy    S/P BALDOMERO-BSO  4/21/11        Allergies    sulfa drugs (Unknown)    Intolerances        Antimicrobials Day #  :1/5  remdesivir  IVPB   IV Intermittent   day 2/10 decadron  Other Medications:  dexAMETHasone     Tablet 6 milliGRAM(s) Oral daily  enoxaparin Injectable 40 milliGRAM(s) SubCutaneous every 12 hours  potassium chloride    Tablet ER 20 milliEquivalent(s) Oral every 2 hours      FAMILY HISTORY:      SOCIAL HISTORY:  Smoking: [ ]Yes [ ]xNo  ETOH: [ ]Yes [x ]No  Drug Use: [ ]Yes [ x]No   [ x] Single[ ]    T(F): 98 (03-31-21 @ 05:22), Max: 100.3 (03-30-21 @ 18:03)  HR: 68 (03-31-21 @ 05:22)  BP: 127/78 (03-31-21 @ 05:22)  RR: 19 (03-31-21 @ 05:22)  SpO2: 96% (03-31-21 @ 05:22)  Wt(kg): --    PHYSICAL EXAM:  General: alert, no acute distress  Eyes:  anicteric, no conjunctival injection, no discharge  Oropharynx: no lesions or injection 	  Neck: supple, without adenopathy  Lungs: basilar rales to auscultation  Heart: regular rate and rhythm; no murmur, rubs or gallops  Abdomen: soft, nondistended, nontender, without mass or organomegaly  Skin: no lesions  Extremities: no clubbing, cyanosis, . right leg  edema  Neurologic: alert, oriented, moves all extremities    LAB RESULTS:                        15.2   8.98  )-----------( 218      ( 30 Mar 2021 18:25 )             46.4     03-31    x   |  x   |  x   ----------------------------<  x   x    |  x   |  0.47<L>    Ca    9.0      30 Mar 2021 18:25    TPro  7.3  /  Alb  3.2<L>  /  TBili  0.3  /  DBili  <0.1  /  AST  40  /  ALT  34  /  AlkPhos  64  03-31    LIVER FUNCTIONS - ( 31 Mar 2021 06:56 )  Alb: 3.2 g/dL / Pro: 7.3 g/dL / ALK PHOS: 64 U/L / ALT: 34 U/L / AST: 40 U/L / GGT: x               MICROBIOLOGY:  RECENT CULTURES:  03-29 @ 12:24 .Blood Blood-Peripheral     No growth to date.      03-29 @ 10:10 .Blood Blood-Peripheral     No growth to date.      03-29 @ 09:31 .Urine Clean Catch (Midstream) Escherichia coli    >100,000 CFU/ml Escherichia coli            RADIOLOGY REVIEWED:  < from: Xray Chest 1 View- PORTABLE-Urgent (03.30.21 @ 17:39) >    EXAM:  XR CHEST PORTABLE URGENT 1V                            PROCEDURE DATE:  03/30/2021            INTERPRETATION:  EXAMINATION: XR CHEST URGENT    CLINICAL INDICATION: Shortness of breath, cough, and fever. Positive Covid 19 test.    TECHNIQUE: Single frontal view of the chest was obtained.    COMPARISON: Multiple prior chest x-rays, with the most recent dated 3/29/2021.    FINDINGS:  Cardiomegaly.  Diffuse bilateral patchy opacities.  There is no pneumothorax or pleural effusion.    IMPRESSION:  Diffuse bilateral patchy opacities consistent with known Covid 19. Pulmonary vascular congestion cannot be ruled out as well. Degenerative changes of the thoracic spine.    < end of copied text >    D-Dimer Assay, Quantitative (03.30.21 @ 18:25)   D-Dimer Assay, Quantitative: 260 ng/mL DDU   `C-Reactive Protein, Serum: 180: Please note: Reference range has changed due to units of measure change. mg/L (03.30.21 @ 18:25)   Ferritin, Serum: 747 ng/mL   Impression:  75 year old woman with covid, symptomatic for about 10 days but now hypoxic with diffuse infiltrates, meets criteria for rdv and decadron. She reports having chronic edema of the right leg but will check doppler to be sure no dvt    Recommendations:  5 days rdv  10 days decadron  monitor liver, ct  doppler legs  dvt prophylaxis  check stool pcr HPI:   Patient is a 75y female who has been feeling unwell for about 10 days-cough, fever, body aches, diarrhea. She had a ct at [facility] recent and no abnormality. She was given keflex for concern of a uti but her ua is negative for pyuria and she has no symptoms. She continued to feel very sick and then got sob over the past couple of days hence came in. She had a sat of about 90 on RA. Remdesivir and decadron were started and today she feels much better. She denies vomiting, dysuria, hematuria, hemoptysis and has no further abdomen pain.     REVIEW OF SYSTEMS:  All other review of systems negative (Comprehensive ROS)    PAST MEDICAL & SURGICAL HISTORY:  Malignant Neoplasm of Corpus Uteri    History of Appendectomy    S/P BALDOMERO-BSO  4/21/11        Allergies    sulfa drugs (Unknown)    Intolerances        Antimicrobials Day #  :1/5  remdesivir  IVPB   IV Intermittent   day 2/10 decadron  Other Medications:  dexAMETHasone     Tablet 6 milliGRAM(s) Oral daily  enoxaparin Injectable 40 milliGRAM(s) SubCutaneous every 12 hours  potassium chloride    Tablet ER 20 milliEquivalent(s) Oral every 2 hours      FAMILY HISTORY:      SOCIAL HISTORY:  Smoking: [ ]Yes [ ]xNo  ETOH: [ ]Yes [x ]No  Drug Use: [ ]Yes [ x]No   [ x] Single[ ]    T(F): 98 (03-31-21 @ 05:22), Max: 100.3 (03-30-21 @ 18:03)  HR: 68 (03-31-21 @ 05:22)  BP: 127/78 (03-31-21 @ 05:22)  RR: 19 (03-31-21 @ 05:22)  SpO2: 96% (03-31-21 @ 05:22)  Wt(kg): --    PHYSICAL EXAM:  General: alert, no acute distress  Eyes:  anicteric, no conjunctival injection, no discharge  Oropharynx: no lesions or injection 	  Neck: supple, without adenopathy  Lungs: basilar rales to auscultation  Heart: regular rate and rhythm; no murmur, rubs or gallops  Abdomen: soft, nondistended, nontender, without mass or organomegaly  Skin: no lesions  Extremities: no clubbing, cyanosis, . right leg  edema  Neurologic: alert, oriented, moves all extremities    LAB RESULTS:                        15.2   8.98  )-----------( 218      ( 30 Mar 2021 18:25 )             46.4     03-31    x   |  x   |  x   ----------------------------<  x   x    |  x   |  0.47<L>    Ca    9.0      30 Mar 2021 18:25    TPro  7.3  /  Alb  3.2<L>  /  TBili  0.3  /  DBili  <0.1  /  AST  40  /  ALT  34  /  AlkPhos  64  03-31    LIVER FUNCTIONS - ( 31 Mar 2021 06:56 )  Alb: 3.2 g/dL / Pro: 7.3 g/dL / ALK PHOS: 64 U/L / ALT: 34 U/L / AST: 40 U/L / GGT: x               MICROBIOLOGY:  RECENT CULTURES:  03-29 @ 12:24 .Blood Blood-Peripheral     No growth to date.      03-29 @ 10:10 .Blood Blood-Peripheral     No growth to date.      03-29 @ 09:31 .Urine Clean Catch (Midstream) Escherichia coli    >100,000 CFU/ml Escherichia coli            RADIOLOGY REVIEWED:  < from: Xray Chest 1 View- PORTABLE-Urgent (03.30.21 @ 17:39) >    EXAM:  XR CHEST PORTABLE URGENT 1V                            PROCEDURE DATE:  03/30/2021            INTERPRETATION:  EXAMINATION: XR CHEST URGENT    CLINICAL INDICATION: Shortness of breath, cough, and fever. Positive Covid 19 test.    TECHNIQUE: Single frontal view of the chest was obtained.    COMPARISON: Multiple prior chest x-rays, with the most recent dated 3/29/2021.    FINDINGS:  Cardiomegaly.  Diffuse bilateral patchy opacities.  There is no pneumothorax or pleural effusion.    IMPRESSION:  Diffuse bilateral patchy opacities consistent with known Covid 19. Pulmonary vascular congestion cannot be ruled out as well. Degenerative changes of the thoracic spine.    < end of copied text >    D-Dimer Assay, Quantitative (03.30.21 @ 18:25)   D-Dimer Assay, Quantitative: 260 ng/mL DDU   `C-Reactive Protein, Serum: 180: Please note: Reference range has changed due to units of measure change. mg/L (03.30.21 @ 18:25)   Ferritin, Serum: 747 ng/mL   Impression:  75 year old woman with covid, symptomatic for about 10 days but now hypoxic with diffuse infiltrates, meets criteria for rdv and decadron. She reports having chronic edema of the right leg but will check doppler to be sure no dvt    Recommendations:  5 days rdv  10 days decadron  monitor liver, ct  doppler legs  dvt prophylaxis  check stool pcr  despite positive urine cx from [facility] she does not need antibiotics

## 2021-03-31 NOTE — CONSULT NOTE ADULT - ASSESSMENT
75F with presumably active breast CA admitted with hypoxemic respiratory failure and COVID 19. CXR sugg of patchy bilateral opacities c/w viral pneumonia. D=dimer 260 on admission. She was reasonably comfortable of nasal cannula at time of visit. She states R breast mamogram is abnormal and is awaiting w/u to r/o second breast malignancy    REC    Obtain more detailed Oncology history and current rx  CTA ordered given high risk for VTE  Increase Lovenox to 40 mg q12 pending CTA  Remdesevir X 5days  Decadron 6 mg IV qd X 10 days  Trend inflammatory markers

## 2021-03-31 NOTE — PROGRESS NOTE ADULT - ASSESSMENT
75F with presumably active breast CA admitted with hypoxemic respiratory failure and COVID 19. CXR suggestive of patchy bilateral opacities c/w viral pneumonia. D=dimer 260 on admission. She was reasonably comfortable of nasal cannula at time of visit. She states R breast mammogram is abnormal and is awaiting w/u to r/o second breast malignancy  seen by pulm and ID  CTA ordered given high risk for VTE  Increased Lovenox to 40 mg q12 pending CTA  Remdesevir X 5days  Decadron 6 mg IV qd X 10 days  Trend inflammatory markers  cont 3 days of CTX for uncomplicated UTI

## 2021-03-31 NOTE — PROGRESS NOTE ADULT - SUBJECTIVE AND OBJECTIVE BOX
Patient is a 75y old  Female who presents with a chief complaint of acute hypoxic respiratory failure (31 Mar 2021 13:18)      SUBJECTIVE / OVERNIGHT EVENTS: ptn feels better, comfortable on 4L O2NC    MEDICATIONS  (STANDING):  cefTRIAXone   IVPB 1000 milliGRAM(s) IV Intermittent every 24 hours  dexAMETHasone     Tablet 6 milliGRAM(s) Oral daily  enoxaparin Injectable 40 milliGRAM(s) SubCutaneous every 12 hours  potassium chloride    Tablet ER 20 milliEquivalent(s) Oral every 2 hours  remdesivir  IVPB   IV Intermittent     MEDICATIONS  (PRN):      Vital Signs Last 24 Hrs  T(F): 98.3 (03-31-21 @ 13:26), Max: 100.2 (03-30-21 @ 22:01)  HR: 68 (03-31-21 @ 13:26) (68 - 86)  BP: 138/81 (03-31-21 @ 13:26) (127/78 - 141/71)  RR: 19 (03-31-21 @ 13:26) (19 - 20)  SpO2: 94% (03-31-21 @ 13:26) (94% - 96%)  Telemetry:   CAPILLARY BLOOD GLUCOSE        I&O's Summary    31 Mar 2021 07:01  -  31 Mar 2021 18:52  --------------------------------------------------------  IN: 340 mL / OUT: 0 mL / NET: 340 mL        PHYSICAL EXAM:  GENERAL: NAD, well-developed  HEAD:  Atraumatic, Normocephalic  EYES: EOMI, PERRLA, conjunctiva and sclera clear  NECK: Supple, No JVD  CHEST/LUNG: Clear to auscultation bilaterally; No wheeze  HEART: Regular rate and rhythm; No murmurs, rubs, or gallops  ABDOMEN: Soft, Nontender, Nondistended; Bowel sounds present  EXTREMITIES:  2+ Peripheral Pulses, No clubbing, cyanosis, or edema  PSYCH: AAOx3  NEUROLOGY: non-focal  SKIN: No rashes or lesions    LABS:                        15.2   8.98  )-----------( 218      ( 30 Mar 2021 18:25 )             46.4     03-31    x   |  x   |  x   ----------------------------<  x   x    |  x   |  0.47<L>    Ca    9.0      30 Mar 2021 18:25    TPro  7.3  /  Alb  3.2<L>  /  TBili  0.3  /  DBili  <0.1  /  AST  40  /  ALT  34  /  AlkPhos  64  03-31    PT/INR - ( 31 Mar 2021 06:58 )   PT: 14.2 sec;   INR: 1.19 ratio                   RADIOLOGY & ADDITIONAL TESTS:    Imaging Personally Reviewed:    Consultant(s) Notes Reviewed:      Care Discussed with Consultants/Other Providers:

## 2021-04-01 PROCEDURE — 93970 EXTREMITY STUDY: CPT | Mod: 26

## 2021-04-01 RX ORDER — ACETAMINOPHEN 500 MG
650 TABLET ORAL EVERY 6 HOURS
Refills: 0 | Status: DISCONTINUED | OUTPATIENT
Start: 2021-04-01 | End: 2021-04-06

## 2021-04-01 RX ADMIN — Medication 100 MILLIGRAM(S): at 02:32

## 2021-04-01 RX ADMIN — ENOXAPARIN SODIUM 40 MILLIGRAM(S): 100 INJECTION SUBCUTANEOUS at 17:23

## 2021-04-01 RX ADMIN — Medication 200 MILLIGRAM(S): at 17:23

## 2021-04-01 RX ADMIN — Medication 650 MILLIGRAM(S): at 02:32

## 2021-04-01 RX ADMIN — Medication 6 MILLIGRAM(S): at 05:38

## 2021-04-01 RX ADMIN — Medication 100 MILLIGRAM(S): at 23:47

## 2021-04-01 RX ADMIN — REMDESIVIR 500 MILLIGRAM(S): 5 INJECTION INTRAVENOUS at 10:30

## 2021-04-01 RX ADMIN — Medication 650 MILLIGRAM(S): at 17:23

## 2021-04-01 RX ADMIN — Medication 650 MILLIGRAM(S): at 18:00

## 2021-04-01 RX ADMIN — Medication 200 MILLIGRAM(S): at 23:47

## 2021-04-01 RX ADMIN — ENOXAPARIN SODIUM 40 MILLIGRAM(S): 100 INJECTION SUBCUTANEOUS at 05:38

## 2021-04-01 RX ADMIN — Medication 650 MILLIGRAM(S): at 23:47

## 2021-04-01 RX ADMIN — CEFTRIAXONE 100 MILLIGRAM(S): 500 INJECTION, POWDER, FOR SOLUTION INTRAMUSCULAR; INTRAVENOUS at 17:23

## 2021-04-01 RX ADMIN — Medication 650 MILLIGRAM(S): at 03:41

## 2021-04-01 RX ADMIN — Medication 100 MILLIGRAM(S): at 17:23

## 2021-04-01 NOTE — PROGRESS NOTE ADULT - SUBJECTIVE AND OBJECTIVE BOX
Patient is a 75y old  Female who presents with a chief complaint of acute hypoxic respiratory failure (01 Apr 2021 15:27)      SUBJECTIVE / OVERNIGHT EVENTS:    MEDICATIONS  (STANDING):  cefTRIAXone   IVPB 1000 milliGRAM(s) IV Intermittent every 24 hours  dexAMETHasone     Tablet 6 milliGRAM(s) Oral daily  enoxaparin Injectable 40 milliGRAM(s) SubCutaneous every 12 hours  remdesivir  IVPB 100 milliGRAM(s) IV Intermittent every 24 hours  remdesivir  IVPB   IV Intermittent     MEDICATIONS  (PRN):  acetaminophen   Tablet .. 650 milliGRAM(s) Oral every 6 hours PRN Temp greater or equal to 38C (100.4F), Moderate Pain (4 - 6)  benzonatate 100 milliGRAM(s) Oral three times a day PRN Cough  guaiFENesin   Syrup  (Sugar-Free) 200 milliGRAM(s) Oral every 6 hours PRN Cough      Vital Signs Last 24 Hrs  T(F): 97.8 (04-01-21 @ 20:11), Max: 97.9 (04-01-21 @ 11:27)  HR: 65 (04-01-21 @ 20:11) (62 - 65)  BP: 121/74 (04-01-21 @ 20:11) (121/74 - 130/80)  RR: 18 (04-01-21 @ 20:11) (18 - 18)  SpO2: 92% (04-01-21 @ 20:11) (92% - 97%)  Telemetry:   CAPILLARY BLOOD GLUCOSE        I&O's Summary    31 Mar 2021 07:01  -  01 Apr 2021 07:00  --------------------------------------------------------  IN: 340 mL / OUT: 0 mL / NET: 340 mL    01 Apr 2021 07:01  -  01 Apr 2021 23:22  --------------------------------------------------------  IN: 560 mL / OUT: 800 mL / NET: -240 mL        PHYSICAL EXAM:  GENERAL: NAD, well-developed  HEAD:  Atraumatic, Normocephalic  EYES: EOMI, PERRLA, conjunctiva and sclera clear  NECK: Supple, No JVD  CHEST/LUNG: Clear to auscultation bilaterally; No wheeze  HEART: Regular rate and rhythm; No murmurs, rubs, or gallops  ABDOMEN: Soft, Nontender, Nondistended; Bowel sounds present  EXTREMITIES:  2+ Peripheral Pulses, No clubbing, cyanosis, or edema  PSYCH: AAOx3  NEUROLOGY: non-focal  SKIN: No rashes or lesions    LABS:                        14.8   12.14 )-----------( 313      ( 01 Apr 2021 08:24 )             45.2     04-01    139  |  104  |  21  ----------------------------<  159<H>  4.3   |  22  |  0.53    Ca    9.0      01 Apr 2021 06:50    TPro  7.1  /  Alb  3.4  /  TBili  0.3  /  DBili  x   /  AST  29  /  ALT  35  /  AlkPhos  66  04-01    PT/INR - ( 01 Apr 2021 06:50 )   PT: 13.7 sec;   INR: 1.15 ratio                   RADIOLOGY & ADDITIONAL TESTS:    Imaging Personally Reviewed:    Consultant(s) Notes Reviewed:      Care Discussed with Consultants/Other Providers:   Patient is a 75y old  Female who presents with a chief complaint of acute hypoxic respiratory failure (01 Apr 2021 15:27)      SUBJECTIVE / OVERNIGHT EVENTS: feels better    MEDICATIONS  (STANDING):  cefTRIAXone   IVPB 1000 milliGRAM(s) IV Intermittent every 24 hours  dexAMETHasone     Tablet 6 milliGRAM(s) Oral daily  enoxaparin Injectable 40 milliGRAM(s) SubCutaneous every 12 hours  remdesivir  IVPB 100 milliGRAM(s) IV Intermittent every 24 hours  remdesivir  IVPB   IV Intermittent     MEDICATIONS  (PRN):  acetaminophen   Tablet .. 650 milliGRAM(s) Oral every 6 hours PRN Temp greater or equal to 38C (100.4F), Moderate Pain (4 - 6)  benzonatate 100 milliGRAM(s) Oral three times a day PRN Cough  guaiFENesin   Syrup  (Sugar-Free) 200 milliGRAM(s) Oral every 6 hours PRN Cough      Vital Signs Last 24 Hrs  T(F): 97.8 (04-01-21 @ 20:11), Max: 97.9 (04-01-21 @ 11:27)  HR: 65 (04-01-21 @ 20:11) (62 - 65)  BP: 121/74 (04-01-21 @ 20:11) (121/74 - 130/80)  RR: 18 (04-01-21 @ 20:11) (18 - 18)  SpO2: 92% (04-01-21 @ 20:11) (92% - 97%)  Telemetry:   CAPILLARY BLOOD GLUCOSE        I&O's Summary    31 Mar 2021 07:01  -  01 Apr 2021 07:00  --------------------------------------------------------  IN: 340 mL / OUT: 0 mL / NET: 340 mL    01 Apr 2021 07:01  -  01 Apr 2021 23:22  --------------------------------------------------------  IN: 560 mL / OUT: 800 mL / NET: -240 mL        PHYSICAL EXAM:  GENERAL: NAD, well-developed  HEAD:  Atraumatic, Normocephalic  EYES: EOMI, PERRLA, conjunctiva and sclera clear  NECK: Supple, No JVD  CHEST/LUNG: Clear to auscultation bilaterally; No wheeze  HEART: Regular rate and rhythm; No murmurs, rubs, or gallops  ABDOMEN: Soft, Nontender, Nondistended; Bowel sounds present  EXTREMITIES:  2+ Peripheral Pulses, No clubbing, cyanosis, or edema  PSYCH: AAOx3  NEUROLOGY: non-focal  SKIN: No rashes or lesions    LABS:                        14.8   12.14 )-----------( 313      ( 01 Apr 2021 08:24 )             45.2     04-01    139  |  104  |  21  ----------------------------<  159<H>  4.3   |  22  |  0.53    Ca    9.0      01 Apr 2021 06:50    TPro  7.1  /  Alb  3.4  /  TBili  0.3  /  DBili  x   /  AST  29  /  ALT  35  /  AlkPhos  66  04-01    PT/INR - ( 01 Apr 2021 06:50 )   PT: 13.7 sec;   INR: 1.15 ratio                   RADIOLOGY & ADDITIONAL TESTS:    Imaging Personally Reviewed:    Consultant(s) Notes Reviewed:      Care Discussed with Consultants/Other Providers:

## 2021-04-01 NOTE — PROGRESS NOTE ADULT - SUBJECTIVE AND OBJECTIVE BOX
Follow-up Pulm Progress Note  Juarez Krueger MD  [phone]    CTA negative for PE - patchy peripheral GG opacities c/w viral pneumonia  Tolerating 4 liters nasal cannula today  D-dimer normal  No rest tachypnea observed at time of visit: grumpy, doesn't feel well    Medications:  Vital Signs Last 24 Hrs  T(C): 36.4 (01 Apr 2021 04:49), Max: 36.9 (31 Mar 2021 20:17)  T(F): 97.6 (01 Apr 2021 04:49), Max: 98.4 (31 Mar 2021 20:17)  HR: 62 (01 Apr 2021 04:49) (62 - 72)  BP: 130/80 (01 Apr 2021 04:49) (128/78 - 138/81)  BP(mean): --  RR: 18 (01 Apr 2021 12:30) (18 - 19)  SpO2: 94% (01 Apr 2021 12:30) (94% - 97%)      03-31 @ 07:01  -  04-01 @ 07:00  --------------------------------------------------------  IN: 340 mL / OUT: 0 mL / NET: 340 mL        LABS:                        14.8   12.14 )-----------( 313      ( 01 Apr 2021 08:24 )             45.2     04-01    139  |  104  |  21  ----------------------------<  159<H>  4.3   |  22  |  0.53    Ca    9.0      01 Apr 2021 06:50    TPro  7.1  /  Alb  3.4  /  TBili  0.3  /  DBili  x   /  AST  29  /  ALT  35  /  AlkPhos  66  04-01      PT/INR - ( 01 Apr 2021 06:50 )   PT: 13.7 sec;   INR: 1.15 ratio           Procalcitonin, Serum: 0.20 ng/mL (04-01-21 @ 06:50)  Procalcitonin, Serum: 0.28 ng/mL (03-30-21 @ 18:25)        CULTURES:  Culture Results:   No growth to date. (03-29 @ 12:24)  Culture Results:   No growth to date. (03-29 @ 10:10)  Culture Results:   >100,000 CFU/ml Escherichia coli (03-29 @ 09:31)    Most recent blood culture -- 03-29 @ 12:24   -- -- .Blood Blood-Peripheral 03-29 @ 12:24  Most recent blood culture -- 03-29 @ 10:10   -- -- .Blood Blood-Peripheral 03-29 @ 10:10  Most recent blood culture -- 03-29 @ 09:31   Escherichia coli Escherichia coli .Urine Clean Catch (Midstream) 03-29 @ 09:31      Physical Examination:  PULM: No wheeze  CVS: Regular rate and rhythm, no murmurs, rubs, or gallops  ABD: Soft, non-tender  EXT:  No clubbing, cyanosis, or edema    RADIOLOGY REVIEWED  CXR:    CT chest:    TTE:

## 2021-04-01 NOTE — PROGRESS NOTE ADULT - SUBJECTIVE AND OBJECTIVE BOX
CC: f/u for covid    Patient reports pain in the left ribs and coughs    REVIEW OF SYSTEMS:  All other review of systems negative (Comprehensive ROS)    Antimicrobials Day #  :2  cefTRIAXone   IVPB 1000 milliGRAM(s) IV Intermittent every 24 hours  remdesivir  IVPB 100 milliGRAM(s) IV Intermittent every 24 hours  remdesivir  IVPB   IV Intermittent     Other Medications Reviewed    T(F): 97.9 (04-01-21 @ 11:27), Max: 98.4 (03-31-21 @ 20:17)  HR: 65 (04-01-21 @ 11:27)  BP: 124/78 (04-01-21 @ 11:27)  RR: 18 (04-01-21 @ 12:30)  SpO2: 94% (04-01-21 @ 12:30)  Wt(kg): --    PHYSICAL EXAM:  General: alert,  acute distress  Eyes:  anicteric, no conjunctival injection, no discharge  Oropharynx: no lesions or injection 	  Neck: supple, without adenopathy  Lungs: clear to auscultation  Heart: regular rate and rhythm; no murmur, rubs or gallops  Abdomen: soft, nondistended, nontender, without mass or organomegaly  Skin: no lesions  Extremities: no clubbing, cyanosis, or edema  Neurologic: alert, oriented, moves all extremities    LAB RESULTS:                        14.8   12.14 )-----------( 313      ( 01 Apr 2021 08:24 )             45.2     04-01    139  |  104  |  21  ----------------------------<  159<H>  4.3   |  22  |  0.53    Ca    9.0      01 Apr 2021 06:50    TPro  7.1  /  Alb  3.4  /  TBili  0.3  /  DBili  x   /  AST  29  /  ALT  35  /  AlkPhos  66  04-01    LIVER FUNCTIONS - ( 01 Apr 2021 06:50 )  Alb: 3.4 g/dL / Pro: 7.1 g/dL / ALK PHOS: 66 U/L / ALT: 35 U/L / AST: 29 U/L / GGT: x             MICROBIOLOGY:  RECENT CULTURES:  03-29 @ 12:24 .Blood Blood-Peripheral     No growth to date.      03-29 @ 10:10 .Blood Blood-Peripheral     No growth to date.      03-29 @ 09:31 .Urine Clean Catch (Midstream) Escherichia coli    >100,000 CFU/ml Escherichia coli          RADIOLOGY REVIEWED:    < from: CT Angio Chest w/ IV Cont (03.31.21 @ 15:06) >  EXAM:  CT ANGIO CHEST (W)AW IC                            PROCEDURE DATE:  03/31/2021            INTERPRETATION:  CLINICAL INFORMATION: Hypoxemia in setting of Covid pneumonia. Evaluate for pulmonary embolism.    COMPARISON: Chest radiograph from 3/30/2021. CT abdomen pelvis from 3/29/2021.    CONTRAST/COMPLICATIONS:  IV Contrast: 72 cc Omnipaque 350 was administered; 28 cc were discarded.  Oral Contrast: None.  Complications: None reported at time of exam    PROCEDURE:  CT Angiography of the Chest.  Sagittal and coronal reformats were performed as well as 3D (MIP) reconstructions.    FINDINGS:    LUNGS AND AIRWAYS: Patent central airways.  Moderate bilateral patchy groundglass opacities.  PLEURA: No pleural effusion or pneumothorax.  MEDIASTINUM AND NATALIA: No lymphadenopathy.  VESSELS: No pulmonary embolism. Minimal aortic atherosclerotic calcification.  HEART: Heart size is normal. No pericardial effusion.  CHEST WALL AND LOWER NECK: Left breast skin thickening.  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  BONES: Degenerative change.    IMPRESSION:    No pulmonary embolism.    Moderate bilateral patchy groundglass opacities consistent with known CIVID pneumonia.    < end of copied text >    D-Dimer Assay, Quantitative: 169 ng/mL DDU   `Ferritin, Serum: 821 ng/mL   C-Reactive Protein, Serum (04.01.21 @ 06:50)   C-Reactive Protein, Serum: 82: Please note: Reference range has changed due to units of measure change. mg/L       Historical Values  C-Reactive Protein, Serum (04.01.21 @ 06:50)   C-Reactive Protein, Serum: 82: Please note: Reference range has changed due to units of measure change. mg/L   C-Reactive Protein, Serum (03.30.21 @ 18:25)   C-Reactive Protein, Serum: 180: Please note: Reference range has changed due to units of measure change.         Assessment:  Patient with covid pneumonia now on rdv and decadron. Has moderate hypoxia but good sats on 3-4 L nc. Has positive urine culture but ua no pyuria and denied any dysura so c/w assymptomatic bactiuria which coppola not required treament.   Plan:  3 more days of remdesivir  8 more days of decadron  defer use of ctx to Dr Jones  monitor liver enzymes and creatinine  monitor sats, ferritin, crp, d dimer   dvt prophylaxis

## 2021-04-01 NOTE — PROGRESS NOTE ADULT - ASSESSMENT
75F with presumably active breast CA admitted with hypoxemic respiratory failure and COVID 19. CXR suggestive of patchy bilateral opacities c/w viral pneumonia. D=dimer 260 on admission. She was reasonably comfortable on 3 L O2 nasal cannula at time of visit. She states R breast mammogram is abnormal and is awaiting w/u to r/o second breast malignancy   pulm and ID on board  CTA neg for PE  Lovenox to 40 mg q12  Remdesevir X 5days  Decadron 6 mg IV qd X 10 days  Trend inflammatory markers  will complete 3 days of CTX for uncomplicated UTI on 4/2

## 2021-04-01 NOTE — PROGRESS NOTE ADULT - ASSESSMENT
Breast carcinoma, recurrent and active  COVID 19 viral infection with bilateral viral pneumonia and hypoxemic respiratory failure  No evidence PE on CTA    REC    COmplete remdesevir and continfue Decadron  DVT prophlaxis with Lovenox  ABX per ID

## 2021-04-02 LAB
ALBUMIN SERPL ELPH-MCNC: 3.2 G/DL — LOW (ref 3.3–5)
ALP SERPL-CCNC: 61 U/L — SIGNIFICANT CHANGE UP (ref 40–120)
ALT FLD-CCNC: 25 U/L — SIGNIFICANT CHANGE UP (ref 10–45)
ANION GAP SERPL CALC-SCNC: 12 MMOL/L — SIGNIFICANT CHANGE UP (ref 5–17)
AST SERPL-CCNC: 18 U/L — SIGNIFICANT CHANGE UP (ref 10–40)
BILIRUB SERPL-MCNC: 0.3 MG/DL — SIGNIFICANT CHANGE UP (ref 0.2–1.2)
BUN SERPL-MCNC: 21 MG/DL — SIGNIFICANT CHANGE UP (ref 7–23)
CALCIUM SERPL-MCNC: 8.8 MG/DL — SIGNIFICANT CHANGE UP (ref 8.4–10.5)
CHLORIDE SERPL-SCNC: 104 MMOL/L — SIGNIFICANT CHANGE UP (ref 96–108)
CO2 SERPL-SCNC: 22 MMOL/L — SIGNIFICANT CHANGE UP (ref 22–31)
CREAT SERPL-MCNC: 0.49 MG/DL — LOW (ref 0.5–1.3)
GLUCOSE SERPL-MCNC: 143 MG/DL — HIGH (ref 70–99)
HCT VFR BLD CALC: 43 % — SIGNIFICANT CHANGE UP (ref 34.5–45)
HGB BLD-MCNC: 14.1 G/DL — SIGNIFICANT CHANGE UP (ref 11.5–15.5)
MCHC RBC-ENTMCNC: 28 PG — SIGNIFICANT CHANGE UP (ref 27–34)
MCHC RBC-ENTMCNC: 32.8 GM/DL — SIGNIFICANT CHANGE UP (ref 32–36)
MCV RBC AUTO: 85.5 FL — SIGNIFICANT CHANGE UP (ref 80–100)
NRBC # BLD: 0 /100 WBCS — SIGNIFICANT CHANGE UP (ref 0–0)
PLATELET # BLD AUTO: 331 K/UL — SIGNIFICANT CHANGE UP (ref 150–400)
POTASSIUM SERPL-MCNC: 4.1 MMOL/L — SIGNIFICANT CHANGE UP (ref 3.5–5.3)
POTASSIUM SERPL-SCNC: 4.1 MMOL/L — SIGNIFICANT CHANGE UP (ref 3.5–5.3)
PROT SERPL-MCNC: 6.9 G/DL — SIGNIFICANT CHANGE UP (ref 6–8.3)
RBC # BLD: 5.03 M/UL — SIGNIFICANT CHANGE UP (ref 3.8–5.2)
RBC # FLD: 14.5 % — SIGNIFICANT CHANGE UP (ref 10.3–14.5)
SODIUM SERPL-SCNC: 138 MMOL/L — SIGNIFICANT CHANGE UP (ref 135–145)
WBC # BLD: 10.3 K/UL — SIGNIFICANT CHANGE UP (ref 3.8–10.5)
WBC # FLD AUTO: 10.3 K/UL — SIGNIFICANT CHANGE UP (ref 3.8–10.5)

## 2021-04-02 RX ADMIN — Medication 100 MILLIGRAM(S): at 13:51

## 2021-04-02 RX ADMIN — ENOXAPARIN SODIUM 40 MILLIGRAM(S): 100 INJECTION SUBCUTANEOUS at 06:00

## 2021-04-02 RX ADMIN — REMDESIVIR 500 MILLIGRAM(S): 5 INJECTION INTRAVENOUS at 11:05

## 2021-04-02 RX ADMIN — Medication 200 MILLIGRAM(S): at 13:51

## 2021-04-02 RX ADMIN — ENOXAPARIN SODIUM 40 MILLIGRAM(S): 100 INJECTION SUBCUTANEOUS at 17:24

## 2021-04-02 RX ADMIN — Medication 200 MILLIGRAM(S): at 21:13

## 2021-04-02 RX ADMIN — Medication 650 MILLIGRAM(S): at 02:37

## 2021-04-02 RX ADMIN — Medication 6 MILLIGRAM(S): at 06:00

## 2021-04-02 RX ADMIN — Medication 100 MILLIGRAM(S): at 21:13

## 2021-04-02 NOTE — PROGRESS NOTE ADULT - SUBJECTIVE AND OBJECTIVE BOX
Patient is a 75y old  Female who presents with a chief complaint of acute hypoxic respiratory failure (02 Apr 2021 17:52)      SUBJECTIVE / OVERNIGHT EVENTS: spoke w daughter Isaura in detail, feels better    MEDICATIONS  (STANDING):  dexAMETHasone     Tablet 6 milliGRAM(s) Oral daily  enoxaparin Injectable 40 milliGRAM(s) SubCutaneous every 12 hours  remdesivir  IVPB 100 milliGRAM(s) IV Intermittent every 24 hours  remdesivir  IVPB   IV Intermittent     MEDICATIONS  (PRN):  acetaminophen   Tablet .. 650 milliGRAM(s) Oral every 6 hours PRN Temp greater or equal to 38C (100.4F), Moderate Pain (4 - 6)  benzonatate 100 milliGRAM(s) Oral three times a day PRN Cough  guaiFENesin   Syrup  (Sugar-Free) 200 milliGRAM(s) Oral every 6 hours PRN Cough      Vital Signs Last 24 Hrs  T(F): 98.5 (04-02-21 @ 20:34), Max: 98.9 (04-02-21 @ 11:57)  HR: 66 (04-02-21 @ 20:34) (60 - 76)  BP: 144/83 (04-02-21 @ 20:34) (114/86 - 144/83)  RR: 18 (04-02-21 @ 20:34) (18 - 18)  SpO2: 96% (04-02-21 @ 20:34) (90% - 96%)  Telemetry:   CAPILLARY BLOOD GLUCOSE        I&O's Summary    01 Apr 2021 07:01  -  02 Apr 2021 07:00  --------------------------------------------------------  IN: 560 mL / OUT: 800 mL / NET: -240 mL    02 Apr 2021 07:01  -  02 Apr 2021 21:55  --------------------------------------------------------  IN: 100 mL / OUT: 0 mL / NET: 100 mL        PHYSICAL EXAM:  GENERAL: NAD, well-developed  HEAD:  Atraumatic, Normocephalic  EYES: EOMI, PERRLA, conjunctiva and sclera clear  NECK: Supple, No JVD  CHEST/LUNG: Clear to auscultation bilaterally; No wheeze  HEART: Regular rate and rhythm; No murmurs, rubs, or gallops  ABDOMEN: Soft, Nontender, Nondistended; Bowel sounds present  EXTREMITIES:  2+ Peripheral Pulses, No clubbing, cyanosis, or edema  PSYCH: AAOx3  NEUROLOGY: non-focal  SKIN: No rashes or lesions    LABS:                        14.1   10.30 )-----------( 331      ( 02 Apr 2021 06:45 )             43.0     04-02    138  |  104  |  21  ----------------------------<  143<H>  4.1   |  22  |  0.49<L>    Ca    8.8      02 Apr 2021 06:45    TPro  6.9  /  Alb  3.2<L>  /  TBili  0.3  /  DBili  x   /  AST  18  /  ALT  25  /  AlkPhos  61  04-02    PT/INR - ( 01 Apr 2021 06:50 )   PT: 13.7 sec;   INR: 1.15 ratio                   RADIOLOGY & ADDITIONAL TESTS:    Imaging Personally Reviewed:    Consultant(s) Notes Reviewed:      Care Discussed with Consultants/Other Providers:

## 2021-04-02 NOTE — PHYSICAL THERAPY INITIAL EVALUATION ADULT - GAIT DEVIATIONS NOTED, PT EVAL
decreased rhonda/increased time in double stance/decreased velocity of limb motion/decreased step length/decreased weight-shifting ability

## 2021-04-02 NOTE — PROGRESS NOTE ADULT - ASSESSMENT
Breast carcinoma, recurrent and active  COVID 19 viral infection with bilateral viral pneumonia and hypoxemic respiratory failure  No evidence PE on CTA    REC    COmplete remdesevir and continfue Decadron  DVT prophlaxis with Lovenox  Taper oxygen: target sat 90-92%

## 2021-04-02 NOTE — PHYSICAL THERAPY INITIAL EVALUATION ADULT - ADDITIONAL COMMENTS
Pt lives in private home with daughter with 4 steps to enter and U/L R hand rail and no stairs inside the home. PTA pt was independent with all ADLs, functional mobility, and amb with no AD.

## 2021-04-02 NOTE — PROGRESS NOTE ADULT - SUBJECTIVE AND OBJECTIVE BOX
Follow-up Pulm Progress Note  Juarez Krueger MD  [phone]    Feeling a little better today - stillc/o weakness  Stable respstatus, on 3 liters nasal cannula       Procalcitonin, Serum: 0.20 ng/mL (04-01-21 @ 06:50)  Procalcitonin, Serum: 0.28 ng/mL (03-30-21 @ 18:25)    CULTURES:  Culture Results:   No growth to date. (03-29 @ 12:24)  Culture Results:   No growth to date. (03-29 @ 10:10)  Culture Results:   >100,000 CFU/ml Escherichia coli (03-29 @ 09:31)    Most recent blood culture -- 03-29 @ 12:24   -- -- .Blood Blood-Peripheral 03-29 @ 12:24  Most recent blood culture -- 03-29 @ 10:10   -- -- .Blood Blood-Peripheral 03-29 @ 10:10  Most recent blood culture -- 03-29 @ 09:31   Escherichia coli Escherichia coli .Urine Clean Catch (Midstream) 03-29 @ 09:31      Physical Examination:  PULM: No wheeze  CVS: Regular rate and rhythm, no murmurs, rubs, or gallops  ABD: Soft, non-tender  EXT:  No clubbing, cyanosis, or edema    RADIOLOGY REVIEWED  CXR:    CT chest:    TTE:

## 2021-04-02 NOTE — PROGRESS NOTE ADULT - ASSESSMENT
75F with presumably active breast CA admitted with hypoxemic respiratory failure and COVID 19. CXR suggestive of patchy bilateral opacities c/w viral pneumonia. D=dimer 260 on admission. She was reasonably comfortable on 3 L O2 nasal cannula at time of visit. She states R breast mammogram is abnormal and is awaiting w/u to r/o second breast malignancy   pulm and ID on board  CTA neg for PE  Lovenox to 40 mg q12  Remdesevir X 5days  Decadron 6 mg IV qd X 10 days  Trend inflammatory markers  completed 3 days of CTX for uncomplicated UTI  spoke w daughter, reassured

## 2021-04-02 NOTE — PHYSICAL THERAPY INITIAL EVALUATION ADULT - GENERAL OBSERVATIONS, REHAB EVAL
Pt tolerated 45 min eval session well, pt +COVID 3/22. Pt rec'd supine in bed; +NC, tele. Lebanese interpret utilized.

## 2021-04-02 NOTE — PHYSICAL THERAPY INITIAL EVALUATION ADULT - PERTINENT HX OF CURRENT PROBLEM, REHAB EVAL
75y F h/o breast cancer, Covid+ (3/22/21) presents with diffuse body aches, fevers, n/v, abdominal pain, decreased PO. Pt was at [facility] with same symptoms, was able to tolerate PO, had UTI.

## 2021-04-02 NOTE — PHARMACOTHERAPY INTERVENTION NOTE - COMMENTS
Patient is a 75yoF presenting with COVID-19. Started on CTX for positive urine culture.     Microbiology:   3/29: Urine Culture: E. Coli > 100,000 (S ceftriaxone)     Current Therapy:   ceftriaxone 1 gram IV once daily (3/30-current)    Labs:   Scr: 0.49    > Patient s/p 3 doses of ceftriaxone. Per ID assymptomatic. Recommend to d/c therapy.    d/w NP    Gemma Barahona, Pharm.D., [facility]  Clinical Pharmacy Specialist  [phone]

## 2021-04-02 NOTE — PHYSICAL THERAPY INITIAL EVALUATION ADULT - PLANNED THERAPY INTERVENTIONS, PT EVAL
Stair training... GOAL: In 2 weeks pt will negotiate 4 steps independently with least restrictive device./balance training/bed mobility training/gait training/strengthening

## 2021-04-03 LAB
ALBUMIN SERPL ELPH-MCNC: 3.1 G/DL — LOW (ref 3.3–5)
ALP SERPL-CCNC: 61 U/L — SIGNIFICANT CHANGE UP (ref 40–120)
ALT FLD-CCNC: 24 U/L — SIGNIFICANT CHANGE UP (ref 10–45)
ANION GAP SERPL CALC-SCNC: 13 MMOL/L — SIGNIFICANT CHANGE UP (ref 5–17)
AST SERPL-CCNC: 18 U/L — SIGNIFICANT CHANGE UP (ref 10–40)
BILIRUB SERPL-MCNC: 0.3 MG/DL — SIGNIFICANT CHANGE UP (ref 0.2–1.2)
BUN SERPL-MCNC: 16 MG/DL — SIGNIFICANT CHANGE UP (ref 7–23)
CALCIUM SERPL-MCNC: 8.7 MG/DL — SIGNIFICANT CHANGE UP (ref 8.4–10.5)
CHLORIDE SERPL-SCNC: 99 MMOL/L — SIGNIFICANT CHANGE UP (ref 96–108)
CO2 SERPL-SCNC: 22 MMOL/L — SIGNIFICANT CHANGE UP (ref 22–31)
CREAT SERPL-MCNC: 0.45 MG/DL — LOW (ref 0.5–1.3)
CULTURE RESULTS: SIGNIFICANT CHANGE UP
CULTURE RESULTS: SIGNIFICANT CHANGE UP
GLUCOSE SERPL-MCNC: 122 MG/DL — HIGH (ref 70–99)
HCT VFR BLD CALC: 42.8 % — SIGNIFICANT CHANGE UP (ref 34.5–45)
HGB BLD-MCNC: 14.2 G/DL — SIGNIFICANT CHANGE UP (ref 11.5–15.5)
MCHC RBC-ENTMCNC: 28.2 PG — SIGNIFICANT CHANGE UP (ref 27–34)
MCHC RBC-ENTMCNC: 33.2 GM/DL — SIGNIFICANT CHANGE UP (ref 32–36)
MCV RBC AUTO: 84.9 FL — SIGNIFICANT CHANGE UP (ref 80–100)
NRBC # BLD: 0 /100 WBCS — SIGNIFICANT CHANGE UP (ref 0–0)
PLATELET # BLD AUTO: 377 K/UL — SIGNIFICANT CHANGE UP (ref 150–400)
POTASSIUM SERPL-MCNC: 3.8 MMOL/L — SIGNIFICANT CHANGE UP (ref 3.5–5.3)
POTASSIUM SERPL-SCNC: 3.8 MMOL/L — SIGNIFICANT CHANGE UP (ref 3.5–5.3)
PROT SERPL-MCNC: 6.7 G/DL — SIGNIFICANT CHANGE UP (ref 6–8.3)
RBC # BLD: 5.04 M/UL — SIGNIFICANT CHANGE UP (ref 3.8–5.2)
RBC # FLD: 14.1 % — SIGNIFICANT CHANGE UP (ref 10.3–14.5)
SODIUM SERPL-SCNC: 134 MMOL/L — LOW (ref 135–145)
SPECIMEN SOURCE: SIGNIFICANT CHANGE UP
SPECIMEN SOURCE: SIGNIFICANT CHANGE UP
WBC # BLD: 12.03 K/UL — HIGH (ref 3.8–10.5)
WBC # FLD AUTO: 12.03 K/UL — HIGH (ref 3.8–10.5)

## 2021-04-03 RX ORDER — SENNA PLUS 8.6 MG/1
2 TABLET ORAL AT BEDTIME
Refills: 0 | Status: DISCONTINUED | OUTPATIENT
Start: 2021-04-03 | End: 2021-04-06

## 2021-04-03 RX ORDER — POLYETHYLENE GLYCOL 3350 17 G/17G
17 POWDER, FOR SOLUTION ORAL
Refills: 0 | Status: DISCONTINUED | OUTPATIENT
Start: 2021-04-03 | End: 2021-04-06

## 2021-04-03 RX ADMIN — Medication 650 MILLIGRAM(S): at 05:37

## 2021-04-03 RX ADMIN — POLYETHYLENE GLYCOL 3350 17 GRAM(S): 17 POWDER, FOR SOLUTION ORAL at 17:56

## 2021-04-03 RX ADMIN — Medication 200 MILLIGRAM(S): at 05:09

## 2021-04-03 RX ADMIN — Medication 100 MILLIGRAM(S): at 05:09

## 2021-04-03 RX ADMIN — Medication 6 MILLIGRAM(S): at 05:08

## 2021-04-03 RX ADMIN — Medication 650 MILLIGRAM(S): at 06:35

## 2021-04-03 RX ADMIN — ENOXAPARIN SODIUM 40 MILLIGRAM(S): 100 INJECTION SUBCUTANEOUS at 05:09

## 2021-04-03 RX ADMIN — Medication 100 MILLIGRAM(S): at 08:14

## 2021-04-03 RX ADMIN — Medication 100 MILLIGRAM(S): at 17:56

## 2021-04-03 RX ADMIN — ENOXAPARIN SODIUM 40 MILLIGRAM(S): 100 INJECTION SUBCUTANEOUS at 17:56

## 2021-04-03 RX ADMIN — REMDESIVIR 500 MILLIGRAM(S): 5 INJECTION INTRAVENOUS at 10:48

## 2021-04-03 RX ADMIN — Medication 200 MILLIGRAM(S): at 21:18

## 2021-04-03 NOTE — PROGRESS NOTE ADULT - SUBJECTIVE AND OBJECTIVE BOX
CC: f/u for covid    Patient reports  very tired today  REVIEW OF SYSTEMS:  All other review of systems negative (Comprehensive ROS)    Antimicrobials Day #  :4/5  remdesivir  IVPB 100 milliGRAM(s) IV Intermittent every 24 hours  remdesivir  IVPB   IV Intermittent     Other Medications Reviewed    T(F): 97.8 (04-03-21 @ 20:11), Max: 97.9 (04-03-21 @ 04:44)  HR: 69 (04-03-21 @ 20:11)  BP: 159/78 (04-03-21 @ 20:11)  RR: 18 (04-03-21 @ 20:11)  SpO2: 93% (04-03-21 @ 20:11)  Wt(kg): --    PHYSICAL EXAM:  General: weak looking, no acute distress  Eyes:  anicteric, no conjunctival injection, no discharge  Oropharynx: no lesions or injection 	  Neck: supple, without adenopathy  Lungs: no effort to auscultation  Heart: regular rate and rhythm; no murmur, rubs or gallops  Abdomen: soft, nondistended, nontender, without mass or organomegaly  Skin: no lesions  Extremities: no clubbing, cyanosis, or edema  Neurologic: , moves all extremities    LAB RESULTS:                        14.2   12.03 )-----------( 377      ( 03 Apr 2021 06:17 )             42.8     04-03    134<L>  |  99  |  16  ----------------------------<  122<H>  3.8   |  22  |  0.45<L>    Ca    8.7      03 Apr 2021 06:17    TPro  6.7  /  Alb  3.1<L>  /  TBili  0.3  /  DBili  x   /  AST  18  /  ALT  24  /  AlkPhos  61  04-03    LIVER FUNCTIONS - ( 03 Apr 2021 06:17 )  Alb: 3.1 g/dL / Pro: 6.7 g/dL / ALK PHOS: 61 U/L / ALT: 24 U/L / AST: 18 U/L / GGT: x           D-Dimer Assay, Quantitative: 169 ng/mL DDU (04.01.21 @ 06:50)   MICROBIOLOGY:  RECENT CULTURES:      RADIOLOGY REVIEWED:    < from: CT Angio Chest w/ IV Cont (03.31.21 @ 15:06) >      PROCEDURE DATE:  03/31/2021            INTERPRETATION:  CLINICAL INFORMATION: Hypoxemia in setting of Covid pneumonia. Evaluate for pulmonary embolism.    COMPARISON: Chest radiograph from 3/30/2021. CT abdomen pelvis from 3/29/2021.    CONTRAST/COMPLICATIONS:  IV Contrast: 72 cc Omnipaque 350 was administered; 28 cc were discarded.  Oral Contrast: None.  Complications: None reported at time of exam    PROCEDURE:  CT Angiography of the Chest.  Sagittal and coronal reformats were performed as well as 3D (MIP) reconstructions.    FINDINGS:    LUNGS AND AIRWAYS: Patent central airways.  Moderate bilateral patchy groundglass opacities.  PLEURA: No pleural effusion or pneumothorax.  MEDIASTINUM AND NATALIA: No lymphadenopathy.  VESSELS: No pulmonary embolism. Minimal aortic atherosclerotic calcification.  HEART: Heart size is normal. No pericardial effusion.  CHEST WALL AND LOWER NECK: Left breast skin thickening.  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  BONES: Degenerative change.    IMPRESSION:    No pulmonary embolism.    Moderate bilateral patchy groundglass opacities consistent with known CIVID pneumonia.          < end of copied text >            Assessment:  Patient with covid pneumonia, on rdv and decadron, tolerates, got tx for cystitis. High wbc from steroids   Plan:  1 more day of rdv  6 more days of decadron  dvt prophylaxis  mobilize

## 2021-04-03 NOTE — PROGRESS NOTE ADULT - SUBJECTIVE AND OBJECTIVE BOX
Patient is a 75y old  Female who presents with a chief complaint of acute hypoxic respiratory failure (03 Apr 2021 21:54)      SUBJECTIVE / OVERNIGHT EVENTS:    MEDICATIONS  (STANDING):  dexAMETHasone     Tablet 6 milliGRAM(s) Oral daily  enoxaparin Injectable 40 milliGRAM(s) SubCutaneous every 12 hours  polyethylene glycol 3350 17 Gram(s) Oral two times a day  remdesivir  IVPB 100 milliGRAM(s) IV Intermittent every 24 hours  remdesivir  IVPB   IV Intermittent   senna 2 Tablet(s) Oral at bedtime    MEDICATIONS  (PRN):  acetaminophen   Tablet .. 650 milliGRAM(s) Oral every 6 hours PRN Temp greater or equal to 38C (100.4F), Moderate Pain (4 - 6)  benzonatate 100 milliGRAM(s) Oral three times a day PRN Cough  guaiFENesin   Syrup  (Sugar-Free) 200 milliGRAM(s) Oral every 6 hours PRN Cough      Vital Signs Last 24 Hrs  T(F): 97.8 (04-03-21 @ 20:11), Max: 97.9 (04-03-21 @ 04:44)  HR: 69 (04-03-21 @ 20:11) (60 - 69)  BP: 159/78 (04-03-21 @ 20:11) (146/75 - 159/78)  RR: 18 (04-03-21 @ 20:11) (18 - 18)  SpO2: 93% (04-03-21 @ 20:11) (93% - 96%)  Telemetry:   CAPILLARY BLOOD GLUCOSE        I&O's Summary    02 Apr 2021 07:01  -  03 Apr 2021 07:00  --------------------------------------------------------  IN: 100 mL / OUT: 800 mL / NET: -700 mL    03 Apr 2021 07:01  -  03 Apr 2021 22:23  --------------------------------------------------------  IN: 340 mL / OUT: 600 mL / NET: -260 mL        PHYSICAL EXAM:  GENERAL: NAD, well-developed  HEAD:  Atraumatic, Normocephalic  EYES: EOMI, PERRLA, conjunctiva and sclera clear  NECK: Supple, No JVD  CHEST/LUNG: Clear to auscultation bilaterally; No wheeze  HEART: Regular rate and rhythm; No murmurs, rubs, or gallops  ABDOMEN: Soft, Nontender, Nondistended; Bowel sounds present  EXTREMITIES:  2+ Peripheral Pulses, No clubbing, cyanosis, or edema  PSYCH: AAOx3  NEUROLOGY: non-focal  SKIN: No rashes or lesions    LABS:                        14.2   12.03 )-----------( 377      ( 03 Apr 2021 06:17 )             42.8     04-03    134<L>  |  99  |  16  ----------------------------<  122<H>  3.8   |  22  |  0.45<L>    Ca    8.7      03 Apr 2021 06:17    TPro  6.7  /  Alb  3.1<L>  /  TBili  0.3  /  DBili  x   /  AST  18  /  ALT  24  /  AlkPhos  61  04-03              RADIOLOGY & ADDITIONAL TESTS:    Imaging Personally Reviewed:    Consultant(s) Notes Reviewed:      Care Discussed with Consultants/Other Providers:   Patient is a 75y old  Female who presents with a chief complaint of acute hypoxic respiratory failure (03 Apr 2021 21:54)      SUBJECTIVE / OVERNIGHT EVENTS: feeling better    MEDICATIONS  (STANDING):  dexAMETHasone     Tablet 6 milliGRAM(s) Oral daily  enoxaparin Injectable 40 milliGRAM(s) SubCutaneous every 12 hours  polyethylene glycol 3350 17 Gram(s) Oral two times a day  remdesivir  IVPB 100 milliGRAM(s) IV Intermittent every 24 hours  remdesivir  IVPB   IV Intermittent   senna 2 Tablet(s) Oral at bedtime    MEDICATIONS  (PRN):  acetaminophen   Tablet .. 650 milliGRAM(s) Oral every 6 hours PRN Temp greater or equal to 38C (100.4F), Moderate Pain (4 - 6)  benzonatate 100 milliGRAM(s) Oral three times a day PRN Cough  guaiFENesin   Syrup  (Sugar-Free) 200 milliGRAM(s) Oral every 6 hours PRN Cough      Vital Signs Last 24 Hrs  T(F): 97.8 (04-03-21 @ 20:11), Max: 97.9 (04-03-21 @ 04:44)  HR: 69 (04-03-21 @ 20:11) (60 - 69)  BP: 159/78 (04-03-21 @ 20:11) (146/75 - 159/78)  RR: 18 (04-03-21 @ 20:11) (18 - 18)  SpO2: 93% (04-03-21 @ 20:11) (93% - 96%)  Telemetry:   CAPILLARY BLOOD GLUCOSE        I&O's Summary    02 Apr 2021 07:01  -  03 Apr 2021 07:00  --------------------------------------------------------  IN: 100 mL / OUT: 800 mL / NET: -700 mL    03 Apr 2021 07:01  -  03 Apr 2021 22:23  --------------------------------------------------------  IN: 340 mL / OUT: 600 mL / NET: -260 mL        PHYSICAL EXAM:  GENERAL: NAD, well-developed  HEAD:  Atraumatic, Normocephalic  EYES: EOMI, PERRLA, conjunctiva and sclera clear  NECK: Supple, No JVD  CHEST/LUNG: Clear to auscultation bilaterally; No wheeze  HEART: Regular rate and rhythm; No murmurs, rubs, or gallops  ABDOMEN: Soft, Nontender, Nondistended; Bowel sounds present  EXTREMITIES:  2+ Peripheral Pulses, No clubbing, cyanosis, or edema  PSYCH: AAOx3  NEUROLOGY: non-focal  SKIN: No rashes or lesions    LABS:                        14.2   12.03 )-----------( 377      ( 03 Apr 2021 06:17 )             42.8     04-03    134<L>  |  99  |  16  ----------------------------<  122<H>  3.8   |  22  |  0.45<L>    Ca    8.7      03 Apr 2021 06:17    TPro  6.7  /  Alb  3.1<L>  /  TBili  0.3  /  DBili  x   /  AST  18  /  ALT  24  /  AlkPhos  61  04-03              RADIOLOGY & ADDITIONAL TESTS:    Imaging Personally Reviewed:    Consultant(s) Notes Reviewed:      Care Discussed with Consultants/Other Providers:

## 2021-04-04 LAB
ALBUMIN SERPL ELPH-MCNC: 3.2 G/DL — LOW (ref 3.3–5)
ALP SERPL-CCNC: 63 U/L — SIGNIFICANT CHANGE UP (ref 40–120)
ALT FLD-CCNC: 26 U/L — SIGNIFICANT CHANGE UP (ref 10–45)
ANION GAP SERPL CALC-SCNC: 11 MMOL/L — SIGNIFICANT CHANGE UP (ref 5–17)
AST SERPL-CCNC: 17 U/L — SIGNIFICANT CHANGE UP (ref 10–40)
BILIRUB SERPL-MCNC: 0.5 MG/DL — SIGNIFICANT CHANGE UP (ref 0.2–1.2)
BUN SERPL-MCNC: 17 MG/DL — SIGNIFICANT CHANGE UP (ref 7–23)
CALCIUM SERPL-MCNC: 8.8 MG/DL — SIGNIFICANT CHANGE UP (ref 8.4–10.5)
CHLORIDE SERPL-SCNC: 103 MMOL/L — SIGNIFICANT CHANGE UP (ref 96–108)
CO2 SERPL-SCNC: 23 MMOL/L — SIGNIFICANT CHANGE UP (ref 22–31)
CREAT SERPL-MCNC: 0.46 MG/DL — LOW (ref 0.5–1.3)
GLUCOSE SERPL-MCNC: 143 MG/DL — HIGH (ref 70–99)
HCT VFR BLD CALC: 46.5 % — HIGH (ref 34.5–45)
HGB BLD-MCNC: 15.1 G/DL — SIGNIFICANT CHANGE UP (ref 11.5–15.5)
MCHC RBC-ENTMCNC: 27.8 PG — SIGNIFICANT CHANGE UP (ref 27–34)
MCHC RBC-ENTMCNC: 32.5 GM/DL — SIGNIFICANT CHANGE UP (ref 32–36)
MCV RBC AUTO: 85.6 FL — SIGNIFICANT CHANGE UP (ref 80–100)
NRBC # BLD: 0 /100 WBCS — SIGNIFICANT CHANGE UP (ref 0–0)
PLATELET # BLD AUTO: 407 K/UL — HIGH (ref 150–400)
POTASSIUM SERPL-MCNC: 4.2 MMOL/L — SIGNIFICANT CHANGE UP (ref 3.5–5.3)
POTASSIUM SERPL-SCNC: 4.2 MMOL/L — SIGNIFICANT CHANGE UP (ref 3.5–5.3)
PROT SERPL-MCNC: 6.8 G/DL — SIGNIFICANT CHANGE UP (ref 6–8.3)
RBC # BLD: 5.43 M/UL — HIGH (ref 3.8–5.2)
RBC # FLD: 14 % — SIGNIFICANT CHANGE UP (ref 10.3–14.5)
SODIUM SERPL-SCNC: 137 MMOL/L — SIGNIFICANT CHANGE UP (ref 135–145)
WBC # BLD: 8.32 K/UL — SIGNIFICANT CHANGE UP (ref 3.8–10.5)
WBC # FLD AUTO: 8.32 K/UL — SIGNIFICANT CHANGE UP (ref 3.8–10.5)

## 2021-04-04 RX ADMIN — Medication 200 MILLIGRAM(S): at 20:56

## 2021-04-04 RX ADMIN — Medication 6 MILLIGRAM(S): at 05:00

## 2021-04-04 RX ADMIN — ENOXAPARIN SODIUM 40 MILLIGRAM(S): 100 INJECTION SUBCUTANEOUS at 17:05

## 2021-04-04 RX ADMIN — Medication 200 MILLIGRAM(S): at 05:01

## 2021-04-04 RX ADMIN — Medication 100 MILLIGRAM(S): at 05:00

## 2021-04-04 RX ADMIN — ENOXAPARIN SODIUM 40 MILLIGRAM(S): 100 INJECTION SUBCUTANEOUS at 05:00

## 2021-04-04 RX ADMIN — REMDESIVIR 500 MILLIGRAM(S): 5 INJECTION INTRAVENOUS at 10:49

## 2021-04-04 RX ADMIN — Medication 100 MILLIGRAM(S): at 21:37

## 2021-04-04 NOTE — PROVIDER CONTACT NOTE (OTHER) - BACKGROUND
pt admitted for sob, weakness, uti s/p ceftraizone
pt admitted for sob, weakness, uti s/p ceftraizone

## 2021-04-04 NOTE — PROGRESS NOTE ADULT - SUBJECTIVE AND OBJECTIVE BOX
Patient is a 75y old  Female who presents with a chief complaint of acute hypoxic respiratory failure (03 Apr 2021 22:23)      SUBJECTIVE / OVERNIGHT EVENTS: feeling better    MEDICATIONS  (STANDING):  dexAMETHasone     Tablet 6 milliGRAM(s) Oral daily  enoxaparin Injectable 40 milliGRAM(s) SubCutaneous every 12 hours  polyethylene glycol 3350 17 Gram(s) Oral two times a day  remdesivir  IVPB 100 milliGRAM(s) IV Intermittent every 24 hours  remdesivir  IVPB   IV Intermittent   senna 2 Tablet(s) Oral at bedtime    MEDICATIONS  (PRN):  acetaminophen   Tablet .. 650 milliGRAM(s) Oral every 6 hours PRN Temp greater or equal to 38C (100.4F), Moderate Pain (4 - 6)  benzonatate 100 milliGRAM(s) Oral three times a day PRN Cough  guaiFENesin   Syrup  (Sugar-Free) 200 milliGRAM(s) Oral every 6 hours PRN Cough      Vital Signs Last 24 Hrs  T(F): 98.5 (04-04-21 @ 11:43), Max: 98.5 (04-04-21 @ 11:43)  HR: 63 (04-04-21 @ 11:43) (61 - 69)  BP: 145/80 (04-04-21 @ 11:43) (145/80 - 175/96)  RR: 18 (04-04-21 @ 17:43) (18 - 18)  SpO2: 91% (04-04-21 @ 17:43) (91% - 96%)  Telemetry:   CAPILLARY BLOOD GLUCOSE        I&O's Summary    03 Apr 2021 07:01  -  04 Apr 2021 07:00  --------------------------------------------------------  IN: 340 mL / OUT: 900 mL / NET: -560 mL    04 Apr 2021 07:01  -  04 Apr 2021 19:15  --------------------------------------------------------  IN: 100 mL / OUT: 150 mL / NET: -50 mL        PHYSICAL EXAM:  GENERAL: NAD, well-developed  HEAD:  Atraumatic, Normocephalic  EYES: EOMI, PERRLA, conjunctiva and sclera clear  NECK: Supple, No JVD  CHEST/LUNG: Clear to auscultation bilaterally; No wheeze  HEART: Regular rate and rhythm; No murmurs, rubs, or gallops  ABDOMEN: Soft, Nontender, Nondistended; Bowel sounds present  EXTREMITIES:  2+ Peripheral Pulses, No clubbing, cyanosis, or edema  PSYCH: AAOx3  NEUROLOGY: non-focal  SKIN: No rashes or lesions    LABS:                        15.1   8.32  )-----------( 407      ( 04 Apr 2021 05:39 )             46.5     04-04    137  |  103  |  17  ----------------------------<  143<H>  4.2   |  23  |  0.46<L>    Ca    8.8      04 Apr 2021 05:39    TPro  6.8  /  Alb  3.2<L>  /  TBili  0.5  /  DBili  x   /  AST  17  /  ALT  26  /  AlkPhos  63  04-04              RADIOLOGY & ADDITIONAL TESTS:    Imaging Personally Reviewed:    Consultant(s) Notes Reviewed:      Care Discussed with Consultants/Other Providers:

## 2021-04-05 LAB
ALBUMIN SERPL ELPH-MCNC: 3.3 G/DL — SIGNIFICANT CHANGE UP (ref 3.3–5)
ALP SERPL-CCNC: 68 U/L — SIGNIFICANT CHANGE UP (ref 40–120)
ALT FLD-CCNC: 22 U/L — SIGNIFICANT CHANGE UP (ref 10–45)
ANION GAP SERPL CALC-SCNC: 13 MMOL/L — SIGNIFICANT CHANGE UP (ref 5–17)
AST SERPL-CCNC: 15 U/L — SIGNIFICANT CHANGE UP (ref 10–40)
BILIRUB SERPL-MCNC: 0.5 MG/DL — SIGNIFICANT CHANGE UP (ref 0.2–1.2)
BUN SERPL-MCNC: 22 MG/DL — SIGNIFICANT CHANGE UP (ref 7–23)
CALCIUM SERPL-MCNC: 9.1 MG/DL — SIGNIFICANT CHANGE UP (ref 8.4–10.5)
CHLORIDE SERPL-SCNC: 101 MMOL/L — SIGNIFICANT CHANGE UP (ref 96–108)
CO2 SERPL-SCNC: 21 MMOL/L — LOW (ref 22–31)
CREAT SERPL-MCNC: 0.47 MG/DL — LOW (ref 0.5–1.3)
GLUCOSE SERPL-MCNC: 140 MG/DL — HIGH (ref 70–99)
HCT VFR BLD CALC: 44.1 % — SIGNIFICANT CHANGE UP (ref 34.5–45)
HGB BLD-MCNC: 14.7 G/DL — SIGNIFICANT CHANGE UP (ref 11.5–15.5)
MCHC RBC-ENTMCNC: 28.3 PG — SIGNIFICANT CHANGE UP (ref 27–34)
MCHC RBC-ENTMCNC: 33.3 GM/DL — SIGNIFICANT CHANGE UP (ref 32–36)
MCV RBC AUTO: 84.8 FL — SIGNIFICANT CHANGE UP (ref 80–100)
NRBC # BLD: 0 /100 WBCS — SIGNIFICANT CHANGE UP (ref 0–0)
PLATELET # BLD AUTO: 476 K/UL — HIGH (ref 150–400)
POTASSIUM SERPL-MCNC: 4 MMOL/L — SIGNIFICANT CHANGE UP (ref 3.5–5.3)
POTASSIUM SERPL-SCNC: 4 MMOL/L — SIGNIFICANT CHANGE UP (ref 3.5–5.3)
PROT SERPL-MCNC: 6.8 G/DL — SIGNIFICANT CHANGE UP (ref 6–8.3)
RBC # BLD: 5.2 M/UL — SIGNIFICANT CHANGE UP (ref 3.8–5.2)
RBC # FLD: 13.9 % — SIGNIFICANT CHANGE UP (ref 10.3–14.5)
SARS-COV-2 RNA SPEC QL NAA+PROBE: DETECTED
SODIUM SERPL-SCNC: 135 MMOL/L — SIGNIFICANT CHANGE UP (ref 135–145)
WBC # BLD: 10.17 K/UL — SIGNIFICANT CHANGE UP (ref 3.8–10.5)
WBC # FLD AUTO: 10.17 K/UL — SIGNIFICANT CHANGE UP (ref 3.8–10.5)

## 2021-04-05 RX ORDER — NYSTATIN CREAM 100000 [USP'U]/G
1 CREAM TOPICAL THREE TIMES A DAY
Refills: 0 | Status: DISCONTINUED | OUTPATIENT
Start: 2021-04-05 | End: 2021-04-06

## 2021-04-05 RX ADMIN — ENOXAPARIN SODIUM 40 MILLIGRAM(S): 100 INJECTION SUBCUTANEOUS at 18:17

## 2021-04-05 RX ADMIN — Medication 6 MILLIGRAM(S): at 04:52

## 2021-04-05 RX ADMIN — Medication 200 MILLIGRAM(S): at 04:53

## 2021-04-05 RX ADMIN — NYSTATIN CREAM 1 APPLICATION(S): 100000 CREAM TOPICAL at 18:17

## 2021-04-05 RX ADMIN — ENOXAPARIN SODIUM 40 MILLIGRAM(S): 100 INJECTION SUBCUTANEOUS at 04:52

## 2021-04-05 RX ADMIN — SENNA PLUS 2 TABLET(S): 8.6 TABLET ORAL at 21:57

## 2021-04-05 NOTE — PROGRESS NOTE ADULT - SUBJECTIVE AND OBJECTIVE BOX
Patient is a 75y old  Female who presents with a chief complaint of acute hypoxic respiratory failure (05 Apr 2021 14:44)      SUBJECTIVE / OVERNIGHT EVENTS: ptn is feeling better, spoke w daughter Isaura, completed Remdesivir    MEDICATIONS  (STANDING):  dexAMETHasone     Tablet 6 milliGRAM(s) Oral daily  enoxaparin Injectable 40 milliGRAM(s) SubCutaneous every 12 hours  nystatin Powder 1 Application(s) Topical three times a day  polyethylene glycol 3350 17 Gram(s) Oral two times a day  senna 2 Tablet(s) Oral at bedtime    MEDICATIONS  (PRN):  acetaminophen   Tablet .. 650 milliGRAM(s) Oral every 6 hours PRN Temp greater or equal to 38C (100.4F), Moderate Pain (4 - 6)  benzonatate 100 milliGRAM(s) Oral three times a day PRN Cough  guaiFENesin   Syrup  (Sugar-Free) 200 milliGRAM(s) Oral every 6 hours PRN Cough      Vital Signs Last 24 Hrs  T(F): 97.5 (04-05-21 @ 20:55), Max: 98 (04-05-21 @ 04:00)  HR: 80 (04-05-21 @ 20:55) (61 - 80)  BP: 135/65 (04-05-21 @ 20:55) (132/72 - 140/69)  RR: 19 (04-05-21 @ 20:55) (18 - 19)  SpO2: 91% (04-05-21 @ 20:55) (91% - 94%)  Telemetry:   CAPILLARY BLOOD GLUCOSE        I&O's Summary    04 Apr 2021 07:01  -  05 Apr 2021 07:00  --------------------------------------------------------  IN: 320 mL / OUT: 450 mL / NET: -130 mL    05 Apr 2021 07:01  -  05 Apr 2021 22:18  --------------------------------------------------------  IN: 780 mL / OUT: 300 mL / NET: 480 mL        PHYSICAL EXAM:  GENERAL: NAD, well-developed  HEAD:  Atraumatic, Normocephalic  EYES: EOMI, PERRLA, conjunctiva and sclera clear  NECK: Supple, No JVD  CHEST/LUNG: Clear to auscultation bilaterally; No wheeze  HEART: Regular rate and rhythm; No murmurs, rubs, or gallops  ABDOMEN: Soft, Nontender, Nondistended; Bowel sounds present  EXTREMITIES:  2+ Peripheral Pulses, No clubbing, cyanosis, or edema  PSYCH: AAOx3  NEUROLOGY: non-focal  SKIN: No rashes or lesions    LABS:                        14.7   10.17 )-----------( 476      ( 05 Apr 2021 05:48 )             44.1     04-05    135  |  101  |  22  ----------------------------<  140<H>  4.0   |  21<L>  |  0.47<L>    Ca    9.1      05 Apr 2021 05:47    TPro  6.8  /  Alb  3.3  /  TBili  0.5  /  DBili  x   /  AST  15  /  ALT  22  /  AlkPhos  68  04-05              RADIOLOGY & ADDITIONAL TESTS:    Imaging Personally Reviewed:    Consultant(s) Notes Reviewed:      Care Discussed with Consultants/Other Providers:

## 2021-04-05 NOTE — DIETITIAN INITIAL EVALUATION ADULT. - PERTINENT LABORATORY DATA
04-05 Na135 mmol/L Glu 140 mg/dL<H> K+ 4.0 mmol/L Cr  0.47 mg/dL<L> BUN 22 mg/dL Phos n/a   Alb 3.3 g/dL PAB n/a

## 2021-04-05 NOTE — PROGRESS NOTE ADULT - SUBJECTIVE AND OBJECTIVE BOX
Follow-up Pulm Progress Note    No new respiratory events overnight.  Denies SOB/CP.     Medications:  MEDICATIONS  (STANDING):  dexAMETHasone     Tablet 6 milliGRAM(s) Oral daily  enoxaparin Injectable 40 milliGRAM(s) SubCutaneous every 12 hours  polyethylene glycol 3350 17 Gram(s) Oral two times a day  senna 2 Tablet(s) Oral at bedtime    MEDICATIONS  (PRN):  acetaminophen   Tablet .. 650 milliGRAM(s) Oral every 6 hours PRN Temp greater or equal to 38C (100.4F), Moderate Pain (4 - 6)  benzonatate 100 milliGRAM(s) Oral three times a day PRN Cough  guaiFENesin   Syrup  (Sugar-Free) 200 milliGRAM(s) Oral every 6 hours PRN Cough    Vital Signs Last 24 Hrs  T(C): 36.5 (05 Apr 2021 11:40), Max: 36.7 (04 Apr 2021 20:16)  T(F): 97.7 (05 Apr 2021 11:40), Max: 98.1 (04 Apr 2021 20:16)  HR: 67 (05 Apr 2021 11:40) (61 - 67)  BP: 132/72 (05 Apr 2021 11:40) (132/72 - 152/85)  BP(mean): --  RR: 19 (05 Apr 2021 12:35) (18 - 19)  SpO2: 92% (05 Apr 2021 12:35) (91% - 94%)    04-04 @ 07:01  -  04-05 @ 07:00  --------------------------------------------------------  IN: 320 mL / OUT: 450 mL / NET: -130 mL          LABS:                        14.7   10.17 )-----------( 476      ( 05 Apr 2021 05:48 )             44.1     04-05    135  |  101  |  22  ----------------------------<  140<H>  4.0   |  21<L>  |  0.47<L>    Ca    9.1      05 Apr 2021 05:47    TPro  6.8  /  Alb  3.3  /  TBili  0.5  /  DBili  x   /  AST  15  /  ALT  22  /  AlkPhos  68  04-05    CAPILLARY BLOOD GLUCOSE    CULTURES: (if applicable)    Culture - Blood (collected 03-29-21 @ 05:50)  Source: .Blood Blood-Peripheral  Final Report (04-03-21 @ 11:00):    No Growth Final    Culture - Blood (collected 03-29-21 @ 04:00)  Source: .Blood Blood-Peripheral  Final Report (04-03-21 @ 13:00):    No Growth Final        Culture - Urine (collected 03-29-21 @ 09:31)  Source: .Urine Clean Catch (Midstream)  Final Report (03-31-21 @ 09:24):    >100,000 CFU/ml Escherichia coli  Organism: Escherichia coli (03-31-21 @ 09:24)  Organism: Escherichia coli (03-31-21 @ 09:24)      -  Amikacin: S <=16      -  Amoxicillin/Clavulanic Acid: S <=8/4      -  Ampicillin: S <=8 These ampicillin results predict results for amoxicillin      -  Ampicillin/Sulbactam: S <=4/2 Enterobacter, Citrobacter, and Serratia may develop resistance during prolonged therapy (3-4 days)      -  Aztreonam: S <=4      -  Cefazolin: S <=2 ([id number]) For uncomplicated UTI with K. pneumoniae, E. coli, or P. mirablis: ELVIS <=16 is sensitive and ELVIS >=32 is resistant. This also predicts results for oral agents cefaclor, cefdinir, cefpodoxime, cefprozil, cefuroxime axetil, cephalexin and locarbef for uncomplicated UTI. Note that some isolates may be susceptible to these agents while testing resistant to cefazolin.      -  Cefepime: S <=2      -  Cefoxitin: S <=8      -  Ceftriaxone: S <=1 Enterobacter, Citrobacter, and Serratia may develop resistance during prolonged therapy      -  Ciprofloxacin: S <=0.25      -  Ertapenem: S <=0.5      -  Gentamicin: S <=2      -  Imipenem: S <=1      -  Levofloxacin: S <=0.5      -  Meropenem: S <=1      -  Nitrofurantoin: S <=32 Should not be used to treat pyelonephritis      -  Piperacillin/Tazobactam: S <=8      -  Tigecycline: S <=2      -  Tobramycin: S <=2      -  Trimethoprim/Sulfamethoxazole: S <=0.5/9.5      Method Type: ELVIS    Physical Examination:  PULM: Decreased BS at bases  CVS: S1, S2 heard    RADIOLOGY REVIEWED  CXR:     CT chest:    TTE:

## 2021-04-05 NOTE — DIETITIAN INITIAL EVALUATION ADULT. - ORAL INTAKE PTA/DIET HISTORY
Pt reports decreased PO intake x2 weeks with covid-19 infection. Normally does not follow a modified diet at home.

## 2021-04-05 NOTE — DIETITIAN INITIAL EVALUATION ADULT. - PERTINENT MEDS FT
MEDICATIONS  (STANDING):  dexAMETHasone     Tablet 6 milliGRAM(s) Oral daily  enoxaparin Injectable 40 milliGRAM(s) SubCutaneous every 12 hours  polyethylene glycol 3350 17 Gram(s) Oral two times a day  senna 2 Tablet(s) Oral at bedtime

## 2021-04-05 NOTE — PROGRESS NOTE ADULT - ASSESSMENT
Breast carcinoma, recurrent and active  COVID 19 viral infection with bilateral viral pneumonia and hypoxemic respiratory failure, improved  No evidence PE on CTA    REC    Completed remdesevir  Continue Decadron  DVT prophylaxis with Lovenox  Taper oxygen: target sat 90-92% Breast carcinoma, recurrent and active  COVID 19 viral infection with bilateral viral pneumonia and hypoxemic respiratory failure, improved  No evidence PE on CTA    REC    Completed remdesevir  Continue Decadron  DVT prophylaxis with Lovenox  Taper oxygen: target sat 90-92%  check RA sats  d/c planning

## 2021-04-05 NOTE — DIETITIAN INITIAL EVALUATION ADULT. - PATIENT MEETS CRITERIA FOR MALNUTRITION
Certification for Inpatient


Patient admitted to: Inpatient


With expected LOS: >2 Midnights


Patient will require the following post-hospital care: None


Practitioner: I am a practitioner with admitting privileges, knowledge of 

patient current condition, hospital course, and medical plan of care.


Services: Services provided to patient in accordance with Admission requirements

found in Title 42 Section 412.3 of the Code of Federal Regulations





Patient History


Date of Service: 07/31/20


Reason for admission: ALTERED MENTAL STATUS


History of Present Illness: 





Patient is a 24-year-old female who was recently discharged from the hospital 

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  Patient also has some abnormalities

with CT of the brain.  Patient had a repeat CT scan done tonight which showed 

progression of neurologic inflammation or ischemia.  The emergency room spoke 

with neurologist who recommended admission for further evaluation.  Fluoroscopic

LP will be done this morning.  MRI stroke protocol as well.  Otherwise, patient 

appears to be doing much better.  She was a little confused on arrival to the ER

but her symptoms have resolved.  She will be admitted to the hospital for 

further workup.


Allergies





Penicillins Allergy (Verified 07/31/20 04:41)


   Hives/Rash





Home Medications: 








Calcitrol [Rocaltrol*] 0.5 mcg PO Q48H 30 Days #15 cap 07/29/20 


Ferrous Sulfate [Ferrous Sulfate*] 325 mg PO TID 30 Days #90 tab 07/29/20 


Hydroxychloroquine [Plaquenil*] 200 mg PO DAILY 30 Days #30 tab 07/29/20 


Levetiracetam [Keppra] 500 mg PO BID 30 Days #60 tablet 07/29/20 


Loratadine [Claritin*] 10 mg PO DAILY PRN 30 Days #30 tab 07/29/20 


Mycophenolate Mofetil [Cellcept] 500 mg PO BID 30 Days #60 07/29/20 


Nepro Shake [Nepro*] 237 ml PO BID 30 Days #60 can 07/29/20 


Pantoprazole Sodium [Protonix] 40 mg PO DAILY 30 Days #30 tablet. 07/29/20 


Venlafaxine HCl [Effexor*] 75 mg PO DAILY 30 Days #30 07/29/20 


predniSONE [Deltasone*] 40 mg PO DAILY 30 Days #30 tab 07/29/20 


Calcium Carbonate [Tums Regular*] 500 mg PO TID 07/31/20 








- Past Medical/Surgical History


Has patient received pneumonia vaccine in the past: No


Diabetic: No


-: Lupus


-: Acute renal failure/ ESRD/dialysis pt


-: Congestive heart failure status post pericardial window in 2008


-: Anemia


-: Right kidney biopsy


-: Pericardial window


Psychosocial/ Personal History: .  Lives at home with .  No 

children





- Family History


  ** Mother


Medical History: Heart disease, Kidney disease





  ** Aunt


Medical History: Diabetes





  ** Grandmother


Medical History: Diabetes





- Social History


Smoking Status: Never smoker


Alcohol use: No


CD- Drugs: No


Caffeine use: No


Place of Residence: Home





Review of Systems


10-point ROS is otherwise unremarkable





Physical Examination





- Vital Signs


Temperature: 97.5 F


Blood Pressure: 201/105


Pulse: 78


Respirations: 14


Pulse Ox (%): 100





- Physical Exam


General: Alert, In no apparent distress, Oriented x3


HEENT: Atraumatic, PERRLA, Mucous membr. moist/pink, EOMI, Sclerae nonicteric


Neck: Supple, 2+ carotid pulse no bruit, No LAD, Without JVD or thyroid 

abnormality


Respiratory: Clear to auscultation bilaterally, Normal air movement


Cardiovascular: Regular rate/rhythm, Normal S1 S2, No murmurs


Gastrointestinal: Normal bowel sounds, Soft and benign, Non-distended, No 

tenderness


Musculoskeletal: No clubbing, No swelling, No tenderness


Integumentary: No rashes


Neurological: Normal gait, Normal speech, Normal strength at 5/5 x4 extr, Normal

tone, Sensation intact, Cranial nerves 3-12 intact, Normal affect


Lymphatics: No axilla or inguinal lymphadenopathy





- Studies


Laboratory Data (last 24 hrs)





07/30/20 23:27: WBC 6.5  D, Hgb 10.4 L, Hct 31.0 L, Plt Count 89 L*


07/30/20 23:27: Sodium 141, Potassium 4.4, BUN 38 H, Creatinine 4.73 H D, 

Glucose 115 H, Phosphorus 3.0, Magnesium 2.0





Microbiology Data (last 24 hrs): 








07/30/20 23:15   Gastric Aspirate   Gastric Occult Blood - Final








Assessment & Plan





- Problems (Diagnosis)


(1) Lupus nephritis


Current Visit: Yes   Status: Acute   





(2) ESRD (end stage renal disease)


Current Visit: Yes   Status: Acute   





(3) Altered mental status


Current Visit: Yes   Status: Acute   





(4) Near syncope


Current Visit: Yes   Status: Acute   





(5) Lupus cerebritis


Current Visit: Yes   Status: Acute   





- Plan





Plan:


1. Anti-inflammatory


2. Anti-platelet therapy and statin therapy


3. Stroke protocol MRI 


4. Discuss with Radiology regarding lumbar puncture;


5. Hemodialysis per Nephrology


6. Resume immunosuppressant including CellCept and Plaquenil


7. Monitor LFTs


8. Strict blood pressure control


9. GI and DVT prophylaxis


Discharge Plan: Home


Plan to discharge in: Greater than 2 days





- Advance Directives


Does patient have a Living Will: No


Does patient have a Durable POA for Healthcare: No





- Code Status/Comfort Care


Code Status Assessed: Yes


Code Status: Full Code


Critical Care: No


Time Spent Managing PTS Care (In Minutes): 50
no

## 2021-04-05 NOTE — DIETITIAN INITIAL EVALUATION ADULT. - CHIEF COMPLAINT
75F with presumably active breast CA admitted with hypoxemic respiratory failure and COVID 19. CXR suggestive of patchy bilateral opacities c/w viral pneumonia

## 2021-04-05 NOTE — PROGRESS NOTE ADULT - ASSESSMENT
75F with presumably active breast CA admitted with hypoxemic respiratory failure and COVID 19. CXR suggestive of patchy bilateral opacities c/w viral pneumonia. D=dimer 260 on admission. She was reasonably comfortable on 3 L O2 nasal cannula at time of visit. She states R breast mammogram is abnormal and is awaiting w/u to r/o second breast malignancy   pulm and ID on board  CTA neg for PE  Lovenox to 40 mg q12  completed Remdesevir X 5days  Decadron 6 mg IV qd X 10 days  completed 3 days of CTX for uncomplicated UTI  spoke w daughter, reassured  DC planning to GUS  RA Pulse ox 92%

## 2021-04-05 NOTE — PROVIDER CONTACT NOTE (OTHER) - RECOMMENDATIONS
provider aware
Patient with hx of COVID+ on 3/22/21. Patient no longer requires isolation at this time. Isolation discontinued as per hospital guidelines. -Infection Control Dept. [id number].
provider aware

## 2021-04-05 NOTE — DIETITIAN INITIAL EVALUATION ADULT. - ADD RECOMMEND
Monitor PO intake and GI tolerance to diet. Overall, pt's PO intake is improved. Pt is aware RD remains available.

## 2021-04-05 NOTE — PROGRESS NOTE ADULT - SUBJECTIVE AND OBJECTIVE BOX
CC: f/u for covid    Patient reports no new c/o  REVIEW OF SYSTEMS:  All other review of systems negative (Comprehensive ROS)    Antimicrobials Day #  5    Other Medications Reviewed    Vital Signs Last 24 Hrs  T(C): 36.5 (05 Apr 2021 11:40), Max: 36.7 (04 Apr 2021 20:16)  T(F): 97.7 (05 Apr 2021 11:40), Max: 98.1 (04 Apr 2021 20:16)  HR: 67 (05 Apr 2021 11:40) (61 - 67)  BP: 132/72 (05 Apr 2021 11:40) (132/72 - 152/85)  BP(mean): --  RR: 19 (05 Apr 2021 12:35) (18 - 19)  SpO2: 92% (05 Apr 2021 12:35) (91% - 94%)    PHYSICAL EXAM:  General: weak looking, no acute distress  Eyes:  anicteric, no conjunctival injection, no discharge  Oropharynx: no lesions or injection 	  Neck: supple, without adenopathy  Lungs: no effort to auscultation  Heart: regular rate and rhythm; no murmur, rubs or gallops  Abdomen: soft, nondistended, nontender, without mass or organomegaly  Skin: no lesions  Extremities: no clubbing, cyanosis, or edema  Neurologic: , moves all extremities    LAB RESULTS:                               14.7   10.17 )-----------( 476      ( 05 Apr 2021 05:48 )             44.1   04-05    135  |  101  |  22  ----------------------------<  140<H>  4.0   |  21<L>  |  0.47<L>    Ca    9.1      05 Apr 2021 05:47    TPro  6.8  /  Alb  3.3  /  TBili  0.5  /  DBili  x   /  AST  15  /  ALT  22  /  AlkPhos  68  04-05    MICROBIOLOGY:  RECENT CULTURES:      RADIOLOGY REVIEWED:  ra< from: [facility] Duplex Lower Ext Vein Scan, Bilat (04.01.21 @ 15:58) >  IMPRESSION:  No evidence of deep venous thrombosis in either lower extremity.    < end of copied text >  < from: CT Angio Chest w/ IV Cont (03.31.21 @ 15:06) >  IMPRESSION:    No pulmonary embolism.    Moderate bilateral patchy groundglass opacities consistent with known CIVID pneumonia.    < end of copied text >    < from: CT Angio Chest w/ IV Cont (03.31.21 @ 15:06) >      PROCEDURE DATE:  03/31/2021            INTERPRETATION:  CLINICAL INFORMATION: Hypoxemia in setting of Covid pneumonia. Evaluate for pulmonary embolism.    COMPARISON: Chest radiograph from 3/30/2021. CT abdomen pelvis from 3/29/2021.    CONTRAST/COMPLICATIONS:  IV Contrast: 72 cc Omnipaque 350 was administered; 28 cc were discarded.  Oral Contrast: None.  Complications: None reported at time of exam    PROCEDURE:  CT Angiography of the Chest.  Sagittal and coronal reformats were performed as well as 3D (MIP) reconstructions.    FINDINGS:    LUNGS AND AIRWAYS: Patent central airways.  Moderate bilateral patchy groundglass opacities.  PLEURA: No pleural effusion or pneumothorax.  MEDIASTINUM AND NATALIA: No lymphadenopathy.  VESSELS: No pulmonary embolism. Minimal aortic atherosclerotic calcification.  HEART: Heart size is normal. No pericardial effusion.  CHEST WALL AND LOWER NECK: Left breast skin thickening.  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  BONES: Degenerative change.    IMPRESSION:    No pulmonary embolism.    Moderate bilateral patchy groundglass opacities consistent with known CIVID pneumonia.          < end of copied text >

## 2021-04-05 NOTE — DIETITIAN INITIAL EVALUATION ADULT. - OTHER INFO
This admission: Appetite and PO intake have improved this admission per pt, with % PO intake of meals per flow sheets. Observed pt had ~75% of lunch today. Mighty shake was not taken.    Confirms NKFA, no nausea/vomiting, no difficulty chewing/swallowing, no GI distress, zinc, vitamin D3, C, B micronutrient supplementation PTA, last BM 4/4 per pt.    Weight Hx:  pounds, consistent with dosing wt 175 pounds (3/30), pt is unsure of any wt loss. Unable to obtain bed scale weight, pt was OOB to chair at time of visit.

## 2021-04-05 NOTE — PROGRESS NOTE ADULT - ASSESSMENT
Patient with covid pneumonia, on rdv and decadron, tolerates, got tx for cystitis.  leukocytosis from steroids, now resolved  Plan:  complete 5 days of Remdesivir  5 more days of decadron  dvt prophylaxis  Monitor inflammatory markers

## 2021-04-06 ENCOUNTER — TRANSCRIPTION ENCOUNTER (OUTPATIENT)
Age: 76
End: 2021-04-06

## 2021-04-06 VITALS
OXYGEN SATURATION: 93 % | HEART RATE: 83 BPM | SYSTOLIC BLOOD PRESSURE: 142 MMHG | TEMPERATURE: 98 F | DIASTOLIC BLOOD PRESSURE: 87 MMHG | RESPIRATION RATE: 19 BRPM

## 2021-04-06 LAB
ANION GAP SERPL CALC-SCNC: 15 MMOL/L — SIGNIFICANT CHANGE UP (ref 5–17)
BUN SERPL-MCNC: 22 MG/DL — SIGNIFICANT CHANGE UP (ref 7–23)
CALCIUM SERPL-MCNC: 9.3 MG/DL — SIGNIFICANT CHANGE UP (ref 8.4–10.5)
CHLORIDE SERPL-SCNC: 99 MMOL/L — SIGNIFICANT CHANGE UP (ref 96–108)
CO2 SERPL-SCNC: 21 MMOL/L — LOW (ref 22–31)
CREAT SERPL-MCNC: 0.53 MG/DL — SIGNIFICANT CHANGE UP (ref 0.5–1.3)
GLUCOSE SERPL-MCNC: 148 MG/DL — HIGH (ref 70–99)
HBV CORE AB SER-ACNC: SIGNIFICANT CHANGE UP
HBV SURFACE AB SER-ACNC: <3 MIU/ML — LOW
HBV SURFACE AB SER-ACNC: SIGNIFICANT CHANGE UP
HBV SURFACE AG SER-ACNC: SIGNIFICANT CHANGE UP
HCT VFR BLD CALC: 45.6 % — HIGH (ref 34.5–45)
HGB BLD-MCNC: 15.2 G/DL — SIGNIFICANT CHANGE UP (ref 11.5–15.5)
MCHC RBC-ENTMCNC: 28.1 PG — SIGNIFICANT CHANGE UP (ref 27–34)
MCHC RBC-ENTMCNC: 33.3 GM/DL — SIGNIFICANT CHANGE UP (ref 32–36)
MCV RBC AUTO: 84.4 FL — SIGNIFICANT CHANGE UP (ref 80–100)
NRBC # BLD: 0 /100 WBCS — SIGNIFICANT CHANGE UP (ref 0–0)
PLATELET # BLD AUTO: 542 K/UL — HIGH (ref 150–400)
POTASSIUM SERPL-MCNC: 3.7 MMOL/L — SIGNIFICANT CHANGE UP (ref 3.5–5.3)
POTASSIUM SERPL-SCNC: 3.7 MMOL/L — SIGNIFICANT CHANGE UP (ref 3.5–5.3)
RBC # BLD: 5.4 M/UL — HIGH (ref 3.8–5.2)
RBC # FLD: 14.1 % — SIGNIFICANT CHANGE UP (ref 10.3–14.5)
SODIUM SERPL-SCNC: 135 MMOL/L — SIGNIFICANT CHANGE UP (ref 135–145)
WBC # BLD: 15.71 K/UL — HIGH (ref 3.8–10.5)
WBC # FLD AUTO: 15.71 K/UL — HIGH (ref 3.8–10.5)

## 2021-04-06 PROCEDURE — 86704 HEP B CORE ANTIBODY TOTAL: CPT

## 2021-04-06 PROCEDURE — 97116 GAIT TRAINING THERAPY: CPT

## 2021-04-06 PROCEDURE — 87340 HEPATITIS B SURFACE AG IA: CPT

## 2021-04-06 PROCEDURE — U0005: CPT

## 2021-04-06 PROCEDURE — U0003: CPT

## 2021-04-06 PROCEDURE — 84145 PROCALCITONIN (PCT): CPT

## 2021-04-06 PROCEDURE — 82565 ASSAY OF CREATININE: CPT

## 2021-04-06 PROCEDURE — 82435 ASSAY OF BLOOD CHLORIDE: CPT

## 2021-04-06 PROCEDURE — 97110 THERAPEUTIC EXERCISES: CPT

## 2021-04-06 PROCEDURE — 85025 COMPLETE CBC W/AUTO DIFF WBC: CPT

## 2021-04-06 PROCEDURE — 80076 HEPATIC FUNCTION PANEL: CPT

## 2021-04-06 PROCEDURE — 93970 EXTREMITY STUDY: CPT

## 2021-04-06 PROCEDURE — 85027 COMPLETE CBC AUTOMATED: CPT

## 2021-04-06 PROCEDURE — 85379 FIBRIN DEGRADATION QUANT: CPT

## 2021-04-06 PROCEDURE — 85014 HEMATOCRIT: CPT

## 2021-04-06 PROCEDURE — 97530 THERAPEUTIC ACTIVITIES: CPT

## 2021-04-06 PROCEDURE — 71045 X-RAY EXAM CHEST 1 VIEW: CPT

## 2021-04-06 PROCEDURE — 84295 ASSAY OF SERUM SODIUM: CPT

## 2021-04-06 PROCEDURE — 86769 SARS-COV-2 COVID-19 ANTIBODY: CPT

## 2021-04-06 PROCEDURE — 82803 BLOOD GASES ANY COMBINATION: CPT

## 2021-04-06 PROCEDURE — 96374 THER/PROPH/DIAG INJ IV PUSH: CPT

## 2021-04-06 PROCEDURE — 85610 PROTHROMBIN TIME: CPT

## 2021-04-06 PROCEDURE — 80048 BASIC METABOLIC PNL TOTAL CA: CPT

## 2021-04-06 PROCEDURE — 80053 COMPREHEN METABOLIC PANEL: CPT

## 2021-04-06 PROCEDURE — 96375 TX/PRO/DX INJ NEW DRUG ADDON: CPT

## 2021-04-06 PROCEDURE — 85018 HEMOGLOBIN: CPT

## 2021-04-06 PROCEDURE — 99285 EMERGENCY DEPT VISIT HI MDM: CPT | Mod: 25

## 2021-04-06 PROCEDURE — 84132 ASSAY OF SERUM POTASSIUM: CPT

## 2021-04-06 PROCEDURE — 83605 ASSAY OF LACTIC ACID: CPT

## 2021-04-06 PROCEDURE — 86140 C-REACTIVE PROTEIN: CPT

## 2021-04-06 PROCEDURE — 97162 PT EVAL MOD COMPLEX 30 MIN: CPT

## 2021-04-06 PROCEDURE — 71275 CT ANGIOGRAPHY CHEST: CPT

## 2021-04-06 PROCEDURE — 86706 HEP B SURFACE ANTIBODY: CPT

## 2021-04-06 PROCEDURE — 82947 ASSAY GLUCOSE BLOOD QUANT: CPT

## 2021-04-06 PROCEDURE — 82330 ASSAY OF CALCIUM: CPT

## 2021-04-06 PROCEDURE — 82728 ASSAY OF FERRITIN: CPT

## 2021-04-06 RX ORDER — CEPHALEXIN 500 MG
1 CAPSULE ORAL
Qty: 20 | Refills: 0

## 2021-04-06 RX ORDER — RIVAROXABAN 15 MG-20MG
1 KIT ORAL
Qty: 30 | Refills: 0
Start: 2021-04-06 | End: 2021-05-05

## 2021-04-06 RX ORDER — POLYETHYLENE GLYCOL 3350 17 G/17G
17 POWDER, FOR SOLUTION ORAL
Qty: 0 | Refills: 0 | DISCHARGE
Start: 2021-04-06

## 2021-04-06 RX ORDER — SENNA PLUS 8.6 MG/1
2 TABLET ORAL
Qty: 0 | Refills: 0 | DISCHARGE
Start: 2021-04-06

## 2021-04-06 RX ORDER — DEXAMETHASONE 0.5 MG/5ML
1 ELIXIR ORAL
Qty: 3 | Refills: 0
Start: 2021-04-06 | End: 2021-04-08

## 2021-04-06 RX ADMIN — NYSTATIN CREAM 1 APPLICATION(S): 100000 CREAM TOPICAL at 13:03

## 2021-04-06 RX ADMIN — NYSTATIN CREAM 1 APPLICATION(S): 100000 CREAM TOPICAL at 06:26

## 2021-04-06 RX ADMIN — ENOXAPARIN SODIUM 40 MILLIGRAM(S): 100 INJECTION SUBCUTANEOUS at 05:24

## 2021-04-06 RX ADMIN — Medication 6 MILLIGRAM(S): at 05:23

## 2021-04-06 RX ADMIN — NYSTATIN CREAM 1 APPLICATION(S): 100000 CREAM TOPICAL at 20:18

## 2021-04-06 RX ADMIN — ENOXAPARIN SODIUM 40 MILLIGRAM(S): 100 INJECTION SUBCUTANEOUS at 18:21

## 2021-04-06 NOTE — PROGRESS NOTE ADULT - ASSESSMENT
75F with presumably active breast CA admitted with hypoxemic respiratory failure and COVID 19. CXR suggestive of patchy bilateral opacities c/w viral pneumonia. D=dimer 260 on admission. She was reasonably comfortable on 3 L O2 nasal cannula at time of visit. She states R breast mammogram is abnormal and is awaiting w/u to r/o second breast malignancy   pulm and ID on board  CTA neg for PE  Lovenox to 40 mg q12  completed Remdesevir X 5days  completed 8 days of Decadron 6 mg IV   completed 3 days of CTX for uncomplicated UTI  spoke w daughter, reassured  DC planning home w home PT today  RA Pulse ox 92%

## 2021-04-06 NOTE — PROGRESS NOTE ADULT - REASON FOR ADMISSION
acute hypoxic respiratory failure

## 2021-04-06 NOTE — PROGRESS NOTE ADULT - NSICDXPILOT_GEN_ALL_CORE
[place]

## 2021-04-06 NOTE — PROGRESS NOTE ADULT - PROVIDER SPECIALTY LIST ADULT
Infectious Disease
Internal Medicine
Infectious Disease
Internal Medicine
Internal Medicine
Pulmonology
Pulmonology
Infectious Disease
Internal Medicine
Pulmonology
Pulmonology

## 2021-04-06 NOTE — PROGRESS NOTE ADULT - ASSESSMENT
Patient with covid pneumonia, s/p rdv and now on decadron,   s/p tx for cystitis.  leukocytosis from steroids  Plan:  to complete decadron 10 days total as planned  dvt prophylaxis  Monitor inflammatory markers and O2 sats

## 2021-04-06 NOTE — DISCHARGE NOTE PROVIDER - NSDCCPCAREPLAN_GEN_ALL_CORE_FT
PRINCIPAL DISCHARGE DIAGNOSIS  Diagnosis: Hypoxia  Assessment and Plan of Treatment: Hypoxia in the setting of Covid now resolved      SECONDARY DISCHARGE DIAGNOSES  Diagnosis: COVID-19  Assessment and Plan of Treatment: Completed Remdesivir  Complete Decadron as prescribed  You tested positive for COVID 19.  You no longer require hospitalization.    Take decadron as ordered to complete treatment course.  Ok to discontinue isolation 10 days from onset of Covid symptoms and no fever for 24 hours without use of Tylenol/Motrin.  Please restrict activities outside of your home except for getting medical care.  Do not go to work, school, or public areas.  Avoid using public transportation, ride-sharing, or taxis.  Separate yourself from other people and animals in your home.  Call ahead before visiting your doctor.  Wear a face mask when you are around other people. Cover your cough and sneezes.  Clean your hands often.  Avoid sharing personal household items.  Clean all frequently touched surfaces daily.    Diagnosis: Acute UTI (urinary tract infection)  Assessment and Plan of Treatment: Completed antibiotics  HOME CARE INSTRUCTIONS  Drink enough water and fluids to keep your urine clear or pale yellow.  Avoid caffeine, tea, and carbonated beverages. They tend to irritate your bladder.  Empty your bladder often. Avoid holding urine for long periods of time.  Empty your bladder before and after sexual intercourse.  After a bowel movement, women should cleanse from front to back. Use each tissue only once.  SEEK MEDICAL CARE IF:  You have back pain.  You develop a fever.  Your symptoms do not begin to resolve within 3 days.  SEEK IMMEDIATE MEDICAL CARE IF:  You have severe back pain or lower abdominal pain.  You develop chills.  You have nausea or vomiting.  You have continued burning or discomfort with urination.

## 2021-04-06 NOTE — PROGRESS NOTE ADULT - SUBJECTIVE AND OBJECTIVE BOX
Follow-up Pulm Progress Note    No new respiratory events overnight.  Denies SOB/CP.     Medications:  MEDICATIONS  (STANDING):  dexAMETHasone     Tablet 6 milliGRAM(s) Oral daily  enoxaparin Injectable 40 milliGRAM(s) SubCutaneous every 12 hours  nystatin Powder 1 Application(s) Topical three times a day  polyethylene glycol 3350 17 Gram(s) Oral two times a day  senna 2 Tablet(s) Oral at bedtime    MEDICATIONS  (PRN):  acetaminophen   Tablet .. 650 milliGRAM(s) Oral every 6 hours PRN Temp greater or equal to 38C (100.4F), Moderate Pain (4 - 6)  benzonatate 100 milliGRAM(s) Oral three times a day PRN Cough  guaiFENesin   Syrup  (Sugar-Free) 200 milliGRAM(s) Oral every 6 hours PRN Cough    Vital Signs Last 24 Hrs  T(C): 36.7 (06 Apr 2021 12:02), Max: 36.7 (06 Apr 2021 12:02)  T(F): 98.1 (06 Apr 2021 12:02), Max: 98.1 (06 Apr 2021 12:02)  HR: 73 (06 Apr 2021 12:02) (70 - 90)  BP: 122/72 (06 Apr 2021 12:02) (122/72 - 146/91)  BP(mean): --  RR: 19 (06 Apr 2021 12:02) (19 - 19)  SpO2: 95% (06 Apr 2021 12:02) (90% - 95%)    04-05 @ 07:01  -  04-06 @ 07:00  --------------------------------------------------------  IN: 780 mL / OUT: 650 mL / NET: 130 mL    LABS:                        15.2   15.71 )-----------( 542      ( 06 Apr 2021 06:05 )             45.6     04-06    135  |  99  |  22  ----------------------------<  148<H>  3.7   |  21<L>  |  0.53    Ca    9.3      06 Apr 2021 06:05    TPro  6.8  /  Alb  3.3  /  TBili  0.5  /  DBili  x   /  AST  15  /  ALT  22  /  AlkPhos  68  04-05    CAPILLARY BLOOD GLUCOSE    CULTURES: (if applicable)    Culture - Blood (collected 03-29-21 @ 05:50)  Source: .Blood Blood-Peripheral  Final Report (04-03-21 @ 11:00):    No Growth Final    Culture - Blood (collected 03-29-21 @ 04:00)  Source: .Blood Blood-Peripheral  Final Report (04-03-21 @ 13:00):    No Growth Final    Culture - Urine (collected 03-29-21 @ 09:31)  Source: .Urine Clean Catch (Midstream)  Final Report (03-31-21 @ 09:24):    >100,000 CFU/ml Escherichia coli  Organism: Escherichia coli (03-31-21 @ 09:24)  Organism: Escherichia coli (03-31-21 @ 09:24)      -  Amikacin: S <=16      -  Amoxicillin/Clavulanic Acid: S <=8/4      -  Ampicillin: S <=8 These ampicillin results predict results for amoxicillin      -  Ampicillin/Sulbactam: S <=4/2 Enterobacter, Citrobacter, and Serratia may develop resistance during prolonged therapy (3-4 days)      -  Aztreonam: S <=4      -  Cefazolin: S <=2 (MIC_CL_COM_ENTERIC_CEFAZU) For uncomplicated UTI with K. pneumoniae, E. coli, or P. mirablis: ELVIS <=16 is sensitive and ELVIS >=32 is resistant. This also predicts results for oral agents cefaclor, cefdinir, cefpodoxime, cefprozil, cefuroxime axetil, cephalexin and locarbef for uncomplicated UTI. Note that some isolates may be susceptible to these agents while testing resistant to cefazolin.      -  Cefepime: S <=2      -  Cefoxitin: S <=8      -  Ceftriaxone: S <=1 Enterobacter, Citrobacter, and Serratia may develop resistance during prolonged therapy      -  Ciprofloxacin: S <=0.25      -  Ertapenem: S <=0.5      -  Gentamicin: S <=2      -  Imipenem: S <=1      -  Levofloxacin: S <=0.5      -  Meropenem: S <=1      -  Nitrofurantoin: S <=32 Should not be used to treat pyelonephritis      -  Piperacillin/Tazobactam: S <=8      -  Tigecycline: S <=2      -  Tobramycin: S <=2      -  Trimethoprim/Sulfamethoxazole: S <=0.5/9.5      Method Type: ELVIS    Physical Examination:  PULM: Decreased BS at bases  CVS: S1, S2 heard    RADIOLOGY REVIEWED  CXR:     CT chest:    TTE:

## 2021-04-06 NOTE — PROGRESS NOTE ADULT - SUBJECTIVE AND OBJECTIVE BOX
Patient is a 75y old  Female who presents with a chief complaint of acute hypoxic respiratory failure (06 Apr 2021 12:48)      SUBJECTIVE / OVERNIGHT EVENTS: feeling better    MEDICATIONS  (STANDING):  dexAMETHasone     Tablet 6 milliGRAM(s) Oral daily  enoxaparin Injectable 40 milliGRAM(s) SubCutaneous every 12 hours  nystatin Powder 1 Application(s) Topical three times a day  polyethylene glycol 3350 17 Gram(s) Oral two times a day  senna 2 Tablet(s) Oral at bedtime    MEDICATIONS  (PRN):  acetaminophen   Tablet .. 650 milliGRAM(s) Oral every 6 hours PRN Temp greater or equal to 38C (100.4F), Moderate Pain (4 - 6)  benzonatate 100 milliGRAM(s) Oral three times a day PRN Cough  guaiFENesin   Syrup  (Sugar-Free) 200 milliGRAM(s) Oral every 6 hours PRN Cough      Vital Signs Last 24 Hrs  T(F): 98.5 (04-06-21 @ 17:19), Max: 98.5 (04-06-21 @ 17:19)  HR: 83 (04-06-21 @ 17:19) (70 - 90)  BP: 142/87 (04-06-21 @ 17:19) (122/72 - 146/91)  RR: 19 (04-06-21 @ 17:19) (19 - 19)  SpO2: 93% (04-06-21 @ 17:19) (90% - 95%)      PHYSICAL EXAM:  GENERAL: NAD, well-developed  HEAD:  Atraumatic, Normocephalic  EYES: EOMI, PERRLA, conjunctiva and sclera clear  NECK: Supple, No JVD  CHEST/LUNG: Clear to auscultation bilaterally; No wheeze  HEART: Regular rate and rhythm; No murmurs, rubs, or gallops  ABDOMEN: Soft, Nontender, Nondistended; Bowel sounds present  EXTREMITIES:  2+ Peripheral Pulses, No clubbing, cyanosis, or edema  PSYCH: AAOx3  NEUROLOGY: non-focal  SKIN: No rashes or lesions    LABS:                        15.2   15.71 )-----------( 542      ( 06 Apr 2021 06:05 )             45.6     04-06    135  |  99  |  22  ----------------------------<  148<H>  3.7   |  21<L>  |  0.53    Ca    9.3      06 Apr 2021 06:05    TPro  6.8  /  Alb  3.3  /  TBili  0.5  /  DBili  x   /  AST  15  /  ALT  22  /  AlkPhos  68  04-05              RADIOLOGY & ADDITIONAL TESTS:    Imaging Personally Reviewed:    Consultant(s) Notes Reviewed:      Care Discussed with Consultants/Other Providers:

## 2021-04-06 NOTE — PROGRESS NOTE ADULT - ASSESSMENT
Breast carcinoma, recurrent and active  COVID 19 viral infection with bilateral viral pneumonia and hypoxemic respiratory failure, improved  No evidence PE on CTA    REC    Completed remdesevir  Continue Decadron  DVT prophylaxis with Lovenox  Taper oxygen: target sat 90-92%  check RA sats  d/c planning

## 2021-04-06 NOTE — DISCHARGE NOTE PROVIDER - NSDCMRMEDTOKEN_GEN_ALL_CORE_FT
anastrozole 1 mg oral tablet: 1 tab(s) orally once a day  Keflex 500 mg oral capsule: 1 cap(s) orally 4 times a day x 5 days    FILLED ON 3/29/21  ondansetron 4 mg oral tablet: 1 tab(s) orally every 8 hours, As Needed  Plaquenil 200 mg oral tablet: 1 tab(s) orally once a day  note: x2 tabs remaining  rolling walker:   Vitamin C:   Vitamin D3:   Zinc:    anastrozole 1 mg oral tablet: 1 tab(s) orally once a day  benzonatate 100 mg oral capsule: 1 cap(s) orally 3 times a day, As needed, Cough  dexamethasone 6 mg oral tablet: 1 tab(s) orally once a day  guaiFENesin 100 mg/5 mL oral liquid: 10 milliliter(s) orally every 6 hours, As needed, Cough x5 days  ondansetron 4 mg oral tablet: 1 tab(s) orally every 8 hours, As Needed  polyethylene glycol 3350 oral powder for reconstitution: 17 gram(s) orally 2 times a day  rolling walker:   senna oral tablet: 2 tab(s) orally once a day (at bedtime)  Vitamin C:   Vitamin D3:   Xarelto 10 mg oral tablet: 1 tab(s) orally once a day   Zinc:

## 2021-04-06 NOTE — DISCHARGE NOTE PROVIDER - HOSPITAL COURSE
75yF h/o breast cancer recurrent and active, now admitted with COVID 19 viral infection with bilateral viral pneumonia and hypoxemic respiratory failure, No evidence PE on CTA. S/p 5 day course of Remdesivir and day 7/10 of Decadron, will complete course as out-pt. Pt also found to have UTI and completed 3 day course of CTX. Pt tolerating RA and is stable for DC home today.

## 2021-04-06 NOTE — DISCHARGE NOTE NURSING/CASE MANAGEMENT/SOCIAL WORK - PATIENT PORTAL LINK FT
You can access the FollowMyHealth Patient Portal offered by [facility] by registering at the following website: [email or web address]. By joining [employer]’s FollowMyHealth portal, you will also be able to view your health information using other applications (apps) compatible with our system.

## 2021-04-06 NOTE — DISCHARGE NOTE PROVIDER - CARE PROVIDER_API CALL
EFRAIN RAY  [id number]  [address] [place] [id number]  Phone: [phone]  Fax: [phone]  Follow Up Time:

## 2021-04-06 NOTE — PROGRESS NOTE ADULT - SUBJECTIVE AND OBJECTIVE BOX
CC: f/u for covid    Patient reports no new c/o  REVIEW OF SYSTEMS:  All other review of systems negative (Comprehensive ROS)    Antimicrobials Day #    d/cd    Other Medications Reviewed  MEDICATIONS  (STANDING):  dexAMETHasone     Tablet 6 milliGRAM(s) Oral daily  enoxaparin Injectable 40 milliGRAM(s) SubCutaneous every 12 hours  nystatin Powder 1 Application(s) Topical three times a day  polyethylene glycol 3350 17 Gram(s) Oral two times a day  senna 2 Tablet(s) Oral at bedtime      Vital Signs Last 24 Hrs  T(C): 36.6 (06 Apr 2021 08:33), Max: 36.6 (06 Apr 2021 08:33)  T(F): 97.8 (06 Apr 2021 08:33), Max: 97.8 (06 Apr 2021 08:33)  HR: 90 (06 Apr 2021 10:07) (67 - 90)  BP: 133/80 (06 Apr 2021 10:07) (132/72 - 146/91)  BP(mean): --  RR: 19 (06 Apr 2021 08:33) (18 - 19)  SpO2: 90% (06 Apr 2021 10:07) (90% - 94%)      PHYSICAL EXAM:  General: no acute distress  Eyes:  anicteric, no conjunctival injection, no discharge  Oropharynx: no lesions or injection 	  Neck: supple, without adenopathy  Lungs: diminished at bases  Heart: regular rate and rhythm; no murmur, rubs or gallops  Abdomen: soft, nondistended, nontender, without mass or organomegaly  Skin: no lesions  Extremities: no clubbing, cyanosis, or edema  Neurologic: , moves all extremities    LAB RESULTS:                          15.2   15.71 )-----------( 542      ( 06 Apr 2021 06:05 )             45.6   04-06    135  |  99  |  22  ----------------------------<  148<H>  3.7   |  21<L>  |  0.53    Ca    9.3      06 Apr 2021 06:05    TPro  6.8  /  Alb  3.3  /  TBili  0.5  /  DBili  x   /  AST  15  /  ALT  22  /  AlkPhos  68  04-05    Ferritin, Serum: 821 ng/mL  C-Reactive Protein, Serum: 82  D-Dimer Assay, Quantitative: 169 ng/mL    MICROBIOLOGY:  RECENT CULTURES:  `Culture - Urine (03.29.21 @ 09:31)   - Tigecycline: S <=2   - Tobramycin: S <=2   - Trimethoprim/Sulfamethoxazole: S <=0.5/9.5   - Piperacillin/Tazobactam: S <=8   - Ampicillin/Sulbactam: S <=4/2 Enterobacter, Citrobacter, and Serratia may develop resistance during prolonged therapy (3-4 days)   - Aztreonam: S <=4   - Cefazolin: S <=2 (MIC_CL_COM_ENTERIC_CEFAZU) For uncomplicated UTI with K. pneumoniae, E. coli, or P. mirablis: ELVIS <=16 is sensitive and ELVIS >=32 is resistant. This also predicts results for oral agents cefaclor, cefdinir, cefpodoxime, cefprozil, cefuroxime axetil, cephalexin and locarbef for uncomplicated UTI. Note that some isolates may be susceptible to these agents while testing resistant to cefazolin.   - Cefepime: S <=2   - Cefoxitin: S <=8   - Ceftriaxone: S <=1 Enterobacter, Citrobacter, and Serratia may develop resistance during prolonged therapy   - Ciprofloxacin: S <=0.25   - Amikacin: S <=16   - Amoxicillin/Clavulanic Acid: S <=8/4   - Ampicillin: S <=8 These ampicillin results predict results for amoxicillin   - Ertapenem: S <=0.5   - Imipenem: S <=1   - Levofloxacin: S <=0.5   - Gentamicin: S <=2   - Meropenem: S <=1   - Nitrofurantoin: S <=32 Should not be used to treat pyelonephritis   Specimen Source: .Urine Clean Catch (Midstream)   Culture Results:   >100,000 CFU/ml Escherichia coli     RADIOLOGY REVIEWED:      < from: VA Duplex Lower Ext Vein Scan, Bilat (04.01.21 @ 15:58) >  IMPRESSION:  No evidence of deep venous thrombosis in either lower extremity.    < end of copied text >  < from: CT Angio Chest w/ IV Cont (03.31.21 @ 15:06) >  IMPRESSION:    No pulmonary embolism.    Moderate bilateral patchy groundglass opacities consistent with known CIVID pneumonia.    < end of copied text >    < from: CT Angio Chest w/ IV Cont (03.31.21 @ 15:06) >      PROCEDURE DATE:  03/31/2021            INTERPRETATION:  CLINICAL INFORMATION: Hypoxemia in setting of Covid pneumonia. Evaluate for pulmonary embolism.    COMPARISON: Chest radiograph from 3/30/2021. CT abdomen pelvis from 3/29/2021.    CONTRAST/COMPLICATIONS:  IV Contrast: 72 cc Omnipaque 350 was administered; 28 cc were discarded.  Oral Contrast: None.  Complications: None reported at time of exam    PROCEDURE:  CT Angiography of the Chest.  Sagittal and coronal reformats were performed as well as 3D (MIP) reconstructions.    FINDINGS:    LUNGS AND AIRWAYS: Patent central airways.  Moderate bilateral patchy groundglass opacities.  PLEURA: No pleural effusion or pneumothorax.  MEDIASTINUM AND NATALIA: No lymphadenopathy.  VESSELS: No pulmonary embolism. Minimal aortic atherosclerotic calcification.  HEART: Heart size is normal. No pericardial effusion.  CHEST WALL AND LOWER NECK: Left breast skin thickening.  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  BONES: Degenerative change.    IMPRESSION:    No pulmonary embolism.    Moderate bilateral patchy groundglass opacities consistent with known CIVID pneumonia.          < end of copied text >

## 2021-04-17 NOTE — ED PROVIDER NOTE - PSYCHIATRIC, MLM
Alert and oriented to person, place, time/situation. normal mood and affect. no apparent risk to self or others. Pt will amb 5'x1 Max A with least restrictive AD

## 2021-06-10 ENCOUNTER — RESULT REVIEW (OUTPATIENT)
Age: 76
End: 2021-06-10

## 2021-09-23 NOTE — DIETITIAN INITIAL EVALUATION ADULT. - NO ACTIVE NUTRITION DIAGNOSIS IDENTIFIED AT THIS TIME
Med refill    methylphenidate (CONCERTA) 27 MG extended release tablet    [facility], [address] [place] [address] Ave - F [phone]  Statement Selected

## 2022-11-10 ENCOUNTER — EMERGENCY (EMERGENCY)
Facility: HOSPITAL | Age: 77
LOS: 1 days | Discharge: HOME | End: 2022-11-10
Attending: EMERGENCY MEDICINE
Payer: COMMERCIAL

## 2022-11-10 VITALS
HEART RATE: 102 BPM | SYSTOLIC BLOOD PRESSURE: 151 MMHG | OXYGEN SATURATION: 96 % | HEIGHT: 60 IN | WEIGHT: 184.97 LBS | RESPIRATION RATE: 16 BRPM | DIASTOLIC BLOOD PRESSURE: 88 MMHG | TEMPERATURE: 100 F

## 2022-11-10 PROCEDURE — 71045 X-RAY EXAM CHEST 1 VIEW: CPT | Mod: 26

## 2022-11-10 PROCEDURE — 99285 EMERGENCY DEPT VISIT HI MDM: CPT

## 2022-11-10 RX ORDER — ACETAMINOPHEN 500 MG
650 TABLET ORAL ONCE
Refills: 0 | Status: COMPLETED | OUTPATIENT
Start: 2022-11-10 | End: 2022-11-10

## 2022-11-10 RX ORDER — ONDANSETRON 8 MG/1
4 TABLET, FILM COATED ORAL ONCE
Refills: 0 | Status: COMPLETED | OUTPATIENT
Start: 2022-11-10 | End: 2022-11-10

## 2022-11-10 RX ORDER — SODIUM CHLORIDE 9 MG/ML
1000 INJECTION INTRAMUSCULAR; INTRAVENOUS; SUBCUTANEOUS ONCE
Refills: 0 | Status: COMPLETED | OUTPATIENT
Start: 2022-11-10 | End: 2022-11-10

## 2022-11-10 RX ORDER — LOPERAMIDE HCL 2 MG
2 TABLET ORAL ONCE
Refills: 0 | Status: COMPLETED | OUTPATIENT
Start: 2022-11-10 | End: 2022-11-10

## 2022-11-10 NOTE — ED ADULT NURSE NOTE - CHPI ED NUR SYMPTOMS POS
[General Appearance - Alert] : alert [General Appearance - In No Acute Distress] : in no acute distress [Oriented To Time, Place, And Person] : oriented to person, place, and time [Impaired Insight] : insight and judgment were intact [Cranial Nerves Optic (II)] : visual acuity intact bilaterally,  pupils equal round and reactive to light [Cranial Nerves Oculomotor (III)] : extraocular motion intact [Cranial Nerves Trigeminal (V)] : facial sensation intact symmetrically [Cranial Nerves Facial (VII)] : face symmetrical [Cranial Nerves Vestibulocochlear (VIII)] : hearing was intact bilaterally [Cranial Nerves Glossopharyngeal (IX)] : tongue and palate midline [Cranial Nerves Accessory (XI - Cranial And Spinal)] : head turning and shoulder shrug symmetric CHILLS/DIARRHEA/FEVER/HEADACHE/PAIN/VOMITING [Cranial Nerves Hypoglossal (XII)] : there was no tongue deviation with protrusion [Sensation Tactile Decrease] : light touch was intact [Sclera] : the sclera and conjunctiva were normal [PERRL With Normal Accommodation] : pupils were equal in size, round, reactive to light, with normal accommodation [Hearing Threshold Finger Rub Not [place]] : hearing was normal [Neck Appearance] : the appearance of the neck was normal [] : no respiratory distress [Respiration, Rhythm And Depth] : normal respiratory rhythm and effort [Edema] : there was no peripheral edema [Abnormal Walk] : normal gait [Involuntary Movements] : no involuntary movements were seen [Motor Tone] : muscle strength and tone were normal [Skin Color & Pigmentation] : normal skin color and pigmentation [Romberg's Sign] : Romberg's sign was negtive [Limited Balance] : balance was intact [Tremor] : no tremor present

## 2022-11-10 NOTE — ED ADULT NURSE NOTE - NSIMPLEMENTINTERV_GEN_ALL_ED
Implemented All Fall Risk Interventions:  [place] to call system. Call bell, personal items and telephone within reach. Instruct patient to call for assistance. Room bathroom lighting operational. Non-slip footwear when patient is off stretcher. Physically safe environment: no spills, clutter or unnecessary equipment. Stretcher in lowest position, wheels locked, appropriate side rails in place. Provide visual cue, wrist band, yellow gown, etc. Monitor gait and stability. Monitor for mental status changes and reorient to person, place, and time. Review medications for side effects contributing to fall risk. Reinforce activity limits and safety measures with patient and family.

## 2022-11-11 VITALS
DIASTOLIC BLOOD PRESSURE: 83 MMHG | HEART RATE: 95 BPM | OXYGEN SATURATION: 97 % | TEMPERATURE: 99 F | RESPIRATION RATE: 16 BRPM | SYSTOLIC BLOOD PRESSURE: 154 MMHG

## 2022-11-11 LAB
ALBUMIN SERPL ELPH-MCNC: 3.2 G/DL — LOW (ref 3.5–5)
ALP SERPL-CCNC: 93 U/L — SIGNIFICANT CHANGE UP (ref 40–120)
ALT FLD-CCNC: 59 U/L DA — SIGNIFICANT CHANGE UP (ref 10–60)
ANION GAP SERPL CALC-SCNC: 8 MMOL/L — SIGNIFICANT CHANGE UP (ref 5–17)
APPEARANCE UR: ABNORMAL
APTT BLD: 35.5 SEC — SIGNIFICANT CHANGE UP (ref 27.5–35.5)
AST SERPL-CCNC: 34 U/L — SIGNIFICANT CHANGE UP (ref 10–40)
BASOPHILS # BLD AUTO: 0.05 K/UL — SIGNIFICANT CHANGE UP (ref 0–0.2)
BASOPHILS NFR BLD AUTO: 0.5 % — SIGNIFICANT CHANGE UP (ref 0–2)
BILIRUB SERPL-MCNC: 0.5 MG/DL — SIGNIFICANT CHANGE UP (ref 0.2–1.2)
BILIRUB UR-MCNC: NEGATIVE — SIGNIFICANT CHANGE UP
BUN SERPL-MCNC: 12 MG/DL — SIGNIFICANT CHANGE UP (ref 7–18)
CALCIUM SERPL-MCNC: 9.3 MG/DL — SIGNIFICANT CHANGE UP (ref 8.4–10.5)
CHLORIDE SERPL-SCNC: 103 MMOL/L — SIGNIFICANT CHANGE UP (ref 96–108)
CO2 SERPL-SCNC: 27 MMOL/L — SIGNIFICANT CHANGE UP (ref 22–31)
COLOR SPEC: YELLOW — SIGNIFICANT CHANGE UP
CREAT SERPL-MCNC: 0.69 MG/DL — SIGNIFICANT CHANGE UP (ref 0.5–1.3)
DIFF PNL FLD: ABNORMAL
EGFR: 88 ML/MIN/1.73M2 — SIGNIFICANT CHANGE UP
EOSINOPHIL # BLD AUTO: 0 K/UL — SIGNIFICANT CHANGE UP (ref 0–0.5)
EOSINOPHIL NFR BLD AUTO: 0 % — SIGNIFICANT CHANGE UP (ref 0–6)
GLUCOSE SERPL-MCNC: 134 MG/DL — HIGH (ref 70–99)
GLUCOSE UR QL: NEGATIVE — SIGNIFICANT CHANGE UP
HCT VFR BLD CALC: 43.2 % — SIGNIFICANT CHANGE UP (ref 34.5–45)
HGB BLD-MCNC: 14.2 G/DL — SIGNIFICANT CHANGE UP (ref 11.5–15.5)
IMM GRANULOCYTES NFR BLD AUTO: 0.9 % — SIGNIFICANT CHANGE UP (ref 0–0.9)
INR BLD: 1.28 RATIO — HIGH (ref 0.88–1.16)
KETONES UR-MCNC: ABNORMAL
LACTATE SERPL-SCNC: 1.1 MMOL/L — SIGNIFICANT CHANGE UP (ref 0.7–2)
LEUKOCYTE ESTERASE UR-ACNC: ABNORMAL
LYMPHOCYTES # BLD AUTO: 1.29 K/UL — SIGNIFICANT CHANGE UP (ref 1–3.3)
LYMPHOCYTES # BLD AUTO: 13.1 % — SIGNIFICANT CHANGE UP (ref 13–44)
MCHC RBC-ENTMCNC: 28.1 PG — SIGNIFICANT CHANGE UP (ref 27–34)
MCHC RBC-ENTMCNC: 32.9 GM/DL — SIGNIFICANT CHANGE UP (ref 32–36)
MCV RBC AUTO: 85.5 FL — SIGNIFICANT CHANGE UP (ref 80–100)
MONOCYTES # BLD AUTO: 1.25 K/UL — HIGH (ref 0–0.9)
MONOCYTES NFR BLD AUTO: 12.7 % — SIGNIFICANT CHANGE UP (ref 2–14)
NEUTROPHILS # BLD AUTO: 7.18 K/UL — SIGNIFICANT CHANGE UP (ref 1.8–7.4)
NEUTROPHILS NFR BLD AUTO: 72.8 % — SIGNIFICANT CHANGE UP (ref 43–77)
NITRITE UR-MCNC: POSITIVE
NRBC # BLD: 0 /100 WBCS — SIGNIFICANT CHANGE UP (ref 0–0)
PH UR: 6 — SIGNIFICANT CHANGE UP (ref 5–8)
PLATELET # BLD AUTO: 278 K/UL — SIGNIFICANT CHANGE UP (ref 150–400)
POTASSIUM SERPL-MCNC: 3.5 MMOL/L — SIGNIFICANT CHANGE UP (ref 3.5–5.3)
POTASSIUM SERPL-SCNC: 3.5 MMOL/L — SIGNIFICANT CHANGE UP (ref 3.5–5.3)
PROT SERPL-MCNC: 7.8 G/DL — SIGNIFICANT CHANGE UP (ref 6–8.3)
PROT UR-MCNC: 100
PROTHROM AB SERPL-ACNC: 15.3 SEC — HIGH (ref 10.5–13.4)
RBC # BLD: 5.05 M/UL — SIGNIFICANT CHANGE UP (ref 3.8–5.2)
RBC # FLD: 15.7 % — HIGH (ref 10.3–14.5)
SODIUM SERPL-SCNC: 138 MMOL/L — SIGNIFICANT CHANGE UP (ref 135–145)
SP GR SPEC: 1.02 — SIGNIFICANT CHANGE UP (ref 1.01–1.02)
TROPONIN I, HIGH SENSITIVITY RESULT: 9.8 NG/L — SIGNIFICANT CHANGE UP
UROBILINOGEN FLD QL: NEGATIVE — SIGNIFICANT CHANGE UP
WBC # BLD: 9.86 K/UL — SIGNIFICANT CHANGE UP (ref 3.8–10.5)
WBC # FLD AUTO: 9.86 K/UL — SIGNIFICANT CHANGE UP (ref 3.8–10.5)

## 2022-11-11 PROCEDURE — 96376 TX/PRO/DX INJ SAME DRUG ADON: CPT

## 2022-11-11 PROCEDURE — 96374 THER/PROPH/DIAG INJ IV PUSH: CPT

## 2022-11-11 PROCEDURE — 81001 URINALYSIS AUTO W/SCOPE: CPT

## 2022-11-11 PROCEDURE — 80053 COMPREHEN METABOLIC PANEL: CPT

## 2022-11-11 PROCEDURE — 99285 EMERGENCY DEPT VISIT HI MDM: CPT | Mod: 25

## 2022-11-11 PROCEDURE — 96375 TX/PRO/DX INJ NEW DRUG ADDON: CPT

## 2022-11-11 PROCEDURE — 36415 COLL VENOUS BLD VENIPUNCTURE: CPT

## 2022-11-11 PROCEDURE — 87040 BLOOD CULTURE FOR BACTERIA: CPT

## 2022-11-11 PROCEDURE — 85025 COMPLETE CBC W/AUTO DIFF WBC: CPT

## 2022-11-11 PROCEDURE — 85610 PROTHROMBIN TIME: CPT

## 2022-11-11 PROCEDURE — 93005 ELECTROCARDIOGRAM TRACING: CPT

## 2022-11-11 PROCEDURE — 71045 X-RAY EXAM CHEST 1 VIEW: CPT

## 2022-11-11 PROCEDURE — 85730 THROMBOPLASTIN TIME PARTIAL: CPT

## 2022-11-11 PROCEDURE — 87186 SC STD MICRODIL/AGAR DIL: CPT

## 2022-11-11 PROCEDURE — 87086 URINE CULTURE/COLONY COUNT: CPT

## 2022-11-11 PROCEDURE — 84484 ASSAY OF TROPONIN QUANT: CPT

## 2022-11-11 PROCEDURE — 83605 ASSAY OF LACTIC ACID: CPT

## 2022-11-11 RX ORDER — CEPHALEXIN 500 MG
1 CAPSULE ORAL
Qty: 28 | Refills: 0
Start: 2022-11-11 | End: 2022-11-17

## 2022-11-11 RX ORDER — ONDANSETRON 8 MG/1
4 TABLET, FILM COATED ORAL ONCE
Refills: 0 | Status: COMPLETED | OUTPATIENT
Start: 2022-11-11 | End: 2022-11-11

## 2022-11-11 RX ORDER — CEFTRIAXONE 500 MG/1
1000 INJECTION, POWDER, FOR SOLUTION INTRAMUSCULAR; INTRAVENOUS ONCE
Refills: 0 | Status: COMPLETED | OUTPATIENT
Start: 2022-11-11 | End: 2022-11-11

## 2022-11-11 RX ORDER — ONDANSETRON 8 MG/1
1 TABLET, FILM COATED ORAL
Qty: 21 | Refills: 0
Start: 2022-11-11 | End: 2022-11-17

## 2022-11-11 RX ADMIN — Medication 2 MILLIGRAM(S): at 00:08

## 2022-11-11 RX ADMIN — CEFTRIAXONE 100 MILLIGRAM(S): 500 INJECTION, POWDER, FOR SOLUTION INTRAMUSCULAR; INTRAVENOUS at 01:36

## 2022-11-11 RX ADMIN — SODIUM CHLORIDE 1000 MILLILITER(S): 9 INJECTION INTRAMUSCULAR; INTRAVENOUS; SUBCUTANEOUS at 00:06

## 2022-11-11 RX ADMIN — Medication 650 MILLIGRAM(S): at 00:06

## 2022-11-11 RX ADMIN — ONDANSETRON 4 MILLIGRAM(S): 8 TABLET, FILM COATED ORAL at 00:06

## 2022-11-11 RX ADMIN — Medication 30 MILLILITER(S): at 00:06

## 2022-11-11 RX ADMIN — ONDANSETRON 4 MILLIGRAM(S): 8 TABLET, FILM COATED ORAL at 03:00

## 2022-11-11 NOTE — ED PROVIDER NOTE - OBJECTIVE STATEMENT
78 y/o woman, h/o breast cancer, uterine cancer, pre-diabetes, DVT 20+ years ago, on Xarelto, former smoker, c/o generalized weakness, fatigue, body aches, fever, cough, vomiting, diarrhea, abdominal discomfort, and mild shortness of breath x 1-2 days, and just had home rapid Covid test which was positive today.  No chest pain.

## 2022-11-11 NOTE — ED PROVIDER NOTE - PROGRESS NOTE DETAILS
Kelby: feeling improved. labs grossly unremarkable. UA with +uti. cxr no lobar infiltrate. given abx. rx for abx. advised to maintain PO intake. f/u with PMD. return precautions discussed.

## 2022-11-11 NOTE — ED PROVIDER NOTE - PATIENT PORTAL LINK FT
You can access the FollowMyHealth Patient Portal offered by [facility] by registering at the following website: [email or web address]. By joining [employer]’s FollowMyHealth portal, you will also be able to view your health information using other applications (apps) compatible with our system.

## 2022-11-11 NOTE — ED PROVIDER NOTE - NSFOLLOWUPINSTRUCTIONS_ED_ALL_ED_FT
ArabicBosnianCanadian FrenchRut[facility] HospitalKoanConne[facility]                                                                                                                                                                         Infección urinaria en los adultos    Urinary Tract Infection, Adult       Yanelis infección urinaria (IU) puede ocurrir en cualquier lugar de las vías urinarias. Las vías urinarias incluyen a los riñones, los uréteres, la vejiga y la uretra. Estos órganos fabrican, almacenan y eliminan la orina del organismo.    La IU padmini afecta los uréteres y los riñones. La IU baja afecta la vejiga y la uretra.      ¿Cuáles son las causas?    La mayoría de las infecciones de las vías urinarias es causada por la presencia de bacterias en la aaron genital, alrededor de la uretra, por donde sale la orina del cuerpo. Estas bacterias proliferan y causan inflamación en las vías urinarias.      ¿Qué incrementa el riesgo?    Es más probable que sufra esta afección si:  •Tiene colocado un catéter urinario permanente.      •No puede controlar cuándo orinar o defecar (incontinencia).    •Es patrick y usted:  •Utiliza espermicida o diafragma ankur método anticonceptivo.      •Tiene niveles bajos de estrógenos.      •Está embarazada.        •Tiene ciertos genes que aumentan núñez riesgo.      •Es sexualmente activa.      •Sepideh antibióticos.    •Tiene yanelis afección que causa que el flujo de orina sea lento, ankur:  •Próstata agrandada, si usted es hombre.      •Obstrucción de la uretra.      •Cálculo renal.      •Yanelis afección nerviosa que afecta el control de la vejiga (vejiga neurógena).      •No sarah lo suficiente o no orina con frecuencia.      •Tiene ciertas enfermedades crónicas, ankur:  •Diabetes.      •Un sistema que combate las enfermedades (sistemainmunitario) debilitado.      •Anemia drepanocítica.      •Gota.      •Lesión en la médula scruggs.          ¿Cuáles son los signos o síntomas?    Los síntomas de esta afección incluyen:  •Necesidad inmediata (urgencia) de orinar.      •Micción frecuente. [facility] puede incluir pequeñas cantidades de orina cada vez que orina.      •Ardor o dolor al orinar.      •Presencia de melanie en la orina.      •Orina con mal olor u olor atípico.      •Dificultad para orinar.      •Orina turbia.      •Secreción vaginal, si es patrick.      •Dolor en el abdomen o en la parte inferior de la espalda.      Es posible que también tenga:  •Vómitos o disminución del apetito.      •Confusión.      •Irritabilidad o cansancio.      •Fiebre o escalofríos.      •Diarrea.      El primer síntoma en los adultos mayores puede ser la confusión. En algunos casos, es posible que no tengan síntomas hasta que la infección empeore.      ¿Cómo se diagnostica?    Esta afección se diagnostica en función de misha antecedentes médicos y de un examen físico. También pueden hacerle otras pruebas, incluidas las siguientes:  •Análisis de orina.      •Análisis de melanie.      •Pruebas de infecciones de transmisión sexual (ITS).      Si ha tenido más de yanelis infección urinaria (IU), se pueden hacer estudios de diagnóstico por imágenes o yanelis cistoscopia para determinar la causa de las infecciones.      ¿Cómo se trata?    El tratamiento de esta afección incluye lo siguiente:  •Antibióticos.      •Medicamentos de venta char para aliviar las molestias.      •Beber yanelis cantidad suficiente agua para mantenerse hidratado.      Si tiene infecciones con frecuencia o tiene otras afecciones, ankur un cálculo renal, es posible que deba bruno a un médico especialista en las vías urinarias (urólogo).    En casos poco frecuentes, las infecciones urinarias pueden provocar sepsis. La sepsis es yanelis afección potencialmente mortal que se produce cuando el cuerpo responde a yanelis infección. La sepsis se trata en el hospital con antibióticos, líquidos y otros medicamentos que se administran por vía intravenosa.      Siga estas instrucciones en núñez casa:     Medicamentos     •Use los medicamentos de venta char y los recetados solamente ankur se lo haya indicado el médico.      •Si le recetaron un antibiótico, tómelo ankur se lo haya indicado el médico. No deje de usar el antibiótico aunque comience a sentirse mejor.      Instrucciones generales   •Asegúrese de hacer lo siguiente:  •Vaciar la vejiga con frecuencia y en núñez totalidad. No contener la orina vicente largos períodos.      •Vaciar la vejiga después de tener sexo.      •Limpiarse de atrás hacia adelante después de orinar o defecar, si es patrick. Usar cada trozo de papel higiénico solo yanelis vez cuando se limpie.        •Beber suficiente líquido ankur para mantener la orina de color amarillo pálido.      •Cumpla con todas las visitas de seguimiento. [facility] es importante.        Comuníquese con un médico si:    •Los síntomas no mejoran después de 1 o 2 días de tratamiento.      •Los síntomas desaparecen y luego vuelven a aparecer.        Solicite ayuda de inmediato si:    •Siente dolor intenso en la espalda o en la parte inferior del abdomen.      •Tiene fiebre o escalofríos.      •Tiene náuseas o vómitos.        Resumen    •Yanelis infección urinaria (IU) es yanelis infección en cualquier parte de las vías urinarias, que incluyen los riñones, los uréteres, la vejiga y la uretra.      •La mayoría de las infecciones de las vías urinarias es causada por bacterias en la aaron genital.      •El tratamiento de esta afección suele incluir antibióticos.      •Si le recetaron un antibiótico, tómelo ankur se lo haya indicado el médico. No deje de usar el antibiótico aunque comience a sentirse mejor.      •Cumpla con todas las visitas de seguimiento. [facility] es importante.      Esta información no tiene ankur fin reemplazar el consejo del médico. Asegúrese de hacerle al médico cualquier pregunta que tenga.      Document Revised: 10/11/2021 Document Reviewed: 10/11/2021    [employer] Patient Education © 2022 [employer]

## 2022-11-11 NOTE — ED PROVIDER NOTE - NSICDXPASTSURGICALHX_GEN_ALL_CORE_FT
PAST SURGICAL HISTORY:  History of Appendectomy     S/P [facility] 4/21/11

## 2022-11-11 NOTE — ED PROVIDER NOTE - CLINICAL SUMMARY MEDICAL DECISION MAKING FREE TEXT BOX
76 y/o woman, h/o breast cancer, uterine cancer, pre-diabetes, DVT 20+ years ago, on Xarelto, former smoker, c/o generalized weakness, fatigue, body aches, fever, cough, vomiting, diarrhea, abdominal discomfort, and mild shortness of breath x 1-2 days, and just had home rapid Covid test which was positive today--pulse ox good on RA, labs, CXR, symptomatic meds, reassess.

## 2022-11-13 LAB
-  AMIKACIN: SIGNIFICANT CHANGE UP
-  AMOXICILLIN/CLAVULANIC ACID: SIGNIFICANT CHANGE UP
-  AMPICILLIN/SULBACTAM: SIGNIFICANT CHANGE UP
-  AMPICILLIN: SIGNIFICANT CHANGE UP
-  AZTREONAM: SIGNIFICANT CHANGE UP
-  CEFAZOLIN: SIGNIFICANT CHANGE UP
-  CEFEPIME: SIGNIFICANT CHANGE UP
-  CEFOXITIN: SIGNIFICANT CHANGE UP
-  CEFTRIAXONE: SIGNIFICANT CHANGE UP
-  CIPROFLOXACIN: SIGNIFICANT CHANGE UP
-  ERTAPENEM: SIGNIFICANT CHANGE UP
-  GENTAMICIN: SIGNIFICANT CHANGE UP
-  IMIPENEM: SIGNIFICANT CHANGE UP
-  LEVOFLOXACIN: SIGNIFICANT CHANGE UP
-  MEROPENEM: SIGNIFICANT CHANGE UP
-  NITROFURANTOIN: SIGNIFICANT CHANGE UP
-  PIPERACILLIN/TAZOBACTAM: SIGNIFICANT CHANGE UP
-  TOBRAMYCIN: SIGNIFICANT CHANGE UP
-  TRIMETHOPRIM/SULFAMETHOXAZOLE: SIGNIFICANT CHANGE UP
CULTURE RESULTS: SIGNIFICANT CHANGE UP
METHOD TYPE: SIGNIFICANT CHANGE UP
ORGANISM # SPEC MICROSCOPIC CNT: SIGNIFICANT CHANGE UP
ORGANISM # SPEC MICROSCOPIC CNT: SIGNIFICANT CHANGE UP
SPECIMEN SOURCE: SIGNIFICANT CHANGE UP

## 2022-11-16 LAB
CULTURE RESULTS: SIGNIFICANT CHANGE UP
CULTURE RESULTS: SIGNIFICANT CHANGE UP
SPECIMEN SOURCE: SIGNIFICANT CHANGE UP
SPECIMEN SOURCE: SIGNIFICANT CHANGE UP

## 2022-11-23 NOTE — ED ADULT NURSE NOTE - COVID-19 RESULT
Pt is an 82 yo M with PMH dementia (AOx1, self) and BPH (s/p TURP) p/w syncopal episode, abd pain, and poor PO intake c/f FTT. Also with c/o hematuria, for which he has been following with urology outpt. Found to have concentrated UA with hematuria, mild lactic acidosis that has resolved, and pending orthostatics / renal US / TTE. PT consulted and pending evaluation.   84 y/o Mongolian male w/ dementia (Aox0-1), hypothyroid, and BPH s/p recent procedure 1 week ago at OSH was brought to the ER for loss of consciousness for about 1 minute while seated at home.  + LOC, no shaking of limbs or urinary/bowel incontinence.  agitated in ED got haldol.   CTH chornic changes.   CT chest: no PE   EEG neg   CD mild stenosis. unremarkable   TTE patient refused     Impression:   1) syncope in setting of UTI. doubt seziure  2) dementia, baseline     - treatment of infection per primary team, on CTX   - for dementia, c/w donepezil 5mg QHS.   - check b12 (WNL), RPR, TSH (WNL)  - f/u urine cx  - cardiacology recs appreciated.   -  orthostatics done as above   - psych f/u for agitation.  was on 1:1. in observation room   - seroquel or zyprexa preferred over haldol.  make sure QTC < 500   - no need for MRI brain at this time   - rEEG in or outpatient   - Hemoglobin A1c and lipid panel  - telemetry  - PT/OT  - check FS, glucose control <180  - GI/DVT ppx  - Thank you for allowing me to participate in the care of this patient. Call with questions.     Fredy Butler MD  Vascular Neurologu  Office: [phone]    POSITIVE Pt is an 84 yo M with PMH dementia (AOx1, self) and BPH (s/p TURP) p/w syncopal episode, abd pain, and poor PO intake c/f FTT. Also with c/o hematuria, for which he has been following with urology outpt. Found to have concentrated UA with hematuria, mild lactic acidosis that has resolved, and pending orthostatics / renal US / TTE. PT consulted and pending evaluation.  Pt is an 84 yo M with PMH dementia (AOx1, self) and BPH (s/p TURP) p/w syncopal episode, abd pain, and poor PO intake c/f FTT. Also with c/o hematuria, for which he has been following with urology outpt. Found to have concentrated UA with hematuria, mild lactic acidosis that has resolved, and pending orthostatics / renal US / TTE. PT consulted and pending evaluation.  Pt is an 84 yo M with PMH dementia (AOx1, self) and BPH (s/p TURP) p/w syncopal episode, abd pain, and poor PO intake c/f FTT. Also with c/o hematuria, for which he has been following with urology outpt. Found to have concentrated UA with hematuria, mild lactic acidosis that has resolved, and pending orthostatics / renal US / TTE. PT consulted and pending evaluation.

## 2023-01-02 NOTE — ED PROVIDER NOTE - PATIENT PORTAL LINK FT
RN DAILY ASSESSMENT-  VIRTUAL PHP    This visit was performed via live interactive two-way video.   Clinician Location: Home -for [facility]   Patient Location: Home       Safety: Denies current safety concerns.    Sleep:  Reporting 8 hours of sleep.    Appetite:  Reporting eating 3 meals daily.    Water intake/hydration:  Reporting adequate water intake.  Stated \"doing pretty good with that\".  Discussed daily recommended amount.      Anxiety:  Rated 2/10 on numeric scale. (10 worst)    Depression:  Rated 4/10 on numeric scale. (10 worst)    AODA:  Denies any substance use in past 24 hours or over the weekend.    Pain:  See pain assessment flowsheet.    Medication compliance:  Patient reporting medication compliance.      Efficacy/side effects:  Patient reporting med compliance.  Stated \"not much anymore, my intrusive thoughts and those brain tremors are almost gone\".  Denies further side effects, questions or concerns at this time.     Nursing Suicide Assessment Note - PHP    Current assessment:  Denies current safety concerns.    Current C-SSRS score: Negative Screen - White     Protective Factors / Reason for Living:  Family, social supports, future orientation, educational goals.    Interventions:    -   Maintain current plan of care     Safety plan reviewed. Patient verbalized agreement to seek out staff, reach out to support persons, seek help in local ER and/or contact 1 or /suicide hotlines prior to acting on dangerous impulses to harm self or others. No further questions or concerns. Support to be continued with treatment.      You can access the FollowMyHealth Patient Portal offered by [facility] by registering at the following website: [email or web address]. By joining [employer]’s FollowMyHealth portal, you will also be able to view your health information using other applications (apps) compatible with our system.

## 2023-10-20 ENCOUNTER — APPOINTMENT (OUTPATIENT)
Dept: NEUROLOGY | Facility: CLINIC | Age: 78
End: 2023-10-20

## 2024-06-14 ENCOUNTER — EMERGENCY (EMERGENCY)
Facility: HOSPITAL | Age: 79
LOS: 1 days | Discharge: HOME | End: 2024-06-14
Attending: EMERGENCY MEDICINE | Admitting: EMERGENCY MEDICINE
Payer: MEDICARE

## 2024-06-14 VITALS
OXYGEN SATURATION: 100 % | SYSTOLIC BLOOD PRESSURE: 147 MMHG | HEART RATE: 71 BPM | TEMPERATURE: 98 F | RESPIRATION RATE: 16 BRPM | DIASTOLIC BLOOD PRESSURE: 81 MMHG

## 2024-06-14 VITALS
WEIGHT: 169.98 LBS | RESPIRATION RATE: 16 BRPM | SYSTOLIC BLOOD PRESSURE: 173 MMHG | DIASTOLIC BLOOD PRESSURE: 87 MMHG | TEMPERATURE: 98 F | HEART RATE: 90 BPM | OXYGEN SATURATION: 93 % | HEIGHT: 60 IN

## 2024-06-14 LAB
ALBUMIN SERPL ELPH-MCNC: 3.6 G/DL — SIGNIFICANT CHANGE UP (ref 3.3–5)
ALP SERPL-CCNC: 104 U/L — SIGNIFICANT CHANGE UP (ref 40–120)
ALT FLD-CCNC: 22 U/L — SIGNIFICANT CHANGE UP (ref 4–33)
ANION GAP SERPL CALC-SCNC: 13 MMOL/L — SIGNIFICANT CHANGE UP (ref 7–14)
APPEARANCE UR: ABNORMAL
AST SERPL-CCNC: 31 U/L — SIGNIFICANT CHANGE UP (ref 4–32)
BACTERIA # UR AUTO: ABNORMAL /HPF
BASOPHILS # BLD AUTO: 0.05 K/UL — SIGNIFICANT CHANGE UP (ref 0–0.2)
BASOPHILS NFR BLD AUTO: 0.4 % — SIGNIFICANT CHANGE UP (ref 0–2)
BILIRUB SERPL-MCNC: 0.4 MG/DL — SIGNIFICANT CHANGE UP (ref 0.2–1.2)
BILIRUB UR-MCNC: NEGATIVE — SIGNIFICANT CHANGE UP
BUN SERPL-MCNC: 17 MG/DL — SIGNIFICANT CHANGE UP (ref 7–23)
CALCIUM SERPL-MCNC: 9.4 MG/DL — SIGNIFICANT CHANGE UP (ref 8.4–10.5)
CHLORIDE SERPL-SCNC: 104 MMOL/L — SIGNIFICANT CHANGE UP (ref 98–107)
CO2 SERPL-SCNC: 22 MMOL/L — SIGNIFICANT CHANGE UP (ref 22–31)
COLOR SPEC: YELLOW — SIGNIFICANT CHANGE UP
CREAT SERPL-MCNC: 0.65 MG/DL — SIGNIFICANT CHANGE UP (ref 0.5–1.3)
DIFF PNL FLD: NEGATIVE — SIGNIFICANT CHANGE UP
EGFR: 88 ML/MIN/1.73M2 — SIGNIFICANT CHANGE UP
EOSINOPHIL # BLD AUTO: 0.14 K/UL — SIGNIFICANT CHANGE UP (ref 0–0.5)
EOSINOPHIL NFR BLD AUTO: 1.1 % — SIGNIFICANT CHANGE UP (ref 0–6)
GLUCOSE SERPL-MCNC: 133 MG/DL — HIGH (ref 70–99)
GLUCOSE UR QL: NEGATIVE MG/DL — SIGNIFICANT CHANGE UP
HCT VFR BLD CALC: 41.1 % — SIGNIFICANT CHANGE UP (ref 34.5–45)
HGB BLD-MCNC: 13.8 G/DL — SIGNIFICANT CHANGE UP (ref 11.5–15.5)
IANC: 9.81 K/UL — HIGH (ref 1.8–7.4)
IMM GRANULOCYTES NFR BLD AUTO: 0.6 % — SIGNIFICANT CHANGE UP (ref 0–0.9)
KETONES UR-MCNC: NEGATIVE MG/DL — SIGNIFICANT CHANGE UP
LEUKOCYTE ESTERASE UR-ACNC: NEGATIVE — SIGNIFICANT CHANGE UP
LIDOCAIN IGE QN: 23 U/L — SIGNIFICANT CHANGE UP (ref 7–60)
LYMPHOCYTES # BLD AUTO: 17 % — SIGNIFICANT CHANGE UP (ref 13–44)
LYMPHOCYTES # BLD AUTO: 2.23 K/UL — SIGNIFICANT CHANGE UP (ref 1–3.3)
MCHC RBC-ENTMCNC: 27.3 PG — SIGNIFICANT CHANGE UP (ref 27–34)
MCHC RBC-ENTMCNC: 33.6 GM/DL — SIGNIFICANT CHANGE UP (ref 32–36)
MCV RBC AUTO: 81.2 FL — SIGNIFICANT CHANGE UP (ref 80–100)
MONOCYTES # BLD AUTO: 0.8 K/UL — SIGNIFICANT CHANGE UP (ref 0–0.9)
MONOCYTES NFR BLD AUTO: 6.1 % — SIGNIFICANT CHANGE UP (ref 2–14)
NEUTROPHILS # BLD AUTO: 9.81 K/UL — HIGH (ref 1.8–7.4)
NEUTROPHILS NFR BLD AUTO: 74.8 % — SIGNIFICANT CHANGE UP (ref 43–77)
NITRITE UR-MCNC: POSITIVE
NRBC # BLD: 0 /100 WBCS — SIGNIFICANT CHANGE UP (ref 0–0)
NRBC # FLD: 0 K/UL — SIGNIFICANT CHANGE UP (ref 0–0)
PH UR: 6 — SIGNIFICANT CHANGE UP (ref 5–8)
PLATELET # BLD AUTO: 350 K/UL — SIGNIFICANT CHANGE UP (ref 150–400)
POTASSIUM SERPL-MCNC: 3.9 MMOL/L — SIGNIFICANT CHANGE UP (ref 3.5–5.3)
POTASSIUM SERPL-SCNC: 3.9 MMOL/L — SIGNIFICANT CHANGE UP (ref 3.5–5.3)
PROT SERPL-MCNC: 7.1 G/DL — SIGNIFICANT CHANGE UP (ref 6–8.3)
PROT UR-MCNC: 100 MG/DL
RBC # BLD: 5.06 M/UL — SIGNIFICANT CHANGE UP (ref 3.8–5.2)
RBC # FLD: 15.9 % — HIGH (ref 10.3–14.5)
RBC CASTS # UR COMP ASSIST: 2 /HPF — SIGNIFICANT CHANGE UP (ref 0–4)
SODIUM SERPL-SCNC: 139 MMOL/L — SIGNIFICANT CHANGE UP (ref 135–145)
SP GR SPEC: 1.02 — SIGNIFICANT CHANGE UP (ref 1–1.03)
UROBILINOGEN FLD QL: 0.2 MG/DL — SIGNIFICANT CHANGE UP (ref 0.2–1)
WBC # BLD: 13.11 K/UL — HIGH (ref 3.8–10.5)
WBC # FLD AUTO: 13.11 K/UL — HIGH (ref 3.8–10.5)
WBC UR QL: SIGNIFICANT CHANGE UP /HPF (ref 0–5)

## 2024-06-14 PROCEDURE — 99285 EMERGENCY DEPT VISIT HI MDM: CPT | Mod: 25

## 2024-06-14 PROCEDURE — 74177 CT ABD & PELVIS W/CONTRAST: CPT | Mod: 26,MC

## 2024-06-14 PROCEDURE — 73502 X-RAY EXAM HIP UNI 2-3 VIEWS: CPT | Mod: 26,RT

## 2024-06-14 RX ORDER — ONDANSETRON 8 MG/1
4 TABLET, FILM COATED ORAL ONCE
Refills: 0 | Status: COMPLETED | OUTPATIENT
Start: 2024-06-14 | End: 2024-06-14

## 2024-06-14 RX ORDER — CEFPODOXIME PROXETIL 100 MG
1 TABLET ORAL
Qty: 20 | Refills: 0
Start: 2024-06-14 | End: 2024-06-23

## 2024-06-14 RX ORDER — ACETAMINOPHEN 500 MG
1000 TABLET ORAL ONCE
Refills: 0 | Status: COMPLETED | OUTPATIENT
Start: 2024-06-14 | End: 2024-06-14

## 2024-06-14 RX ORDER — CEFTRIAXONE 500 MG/1
1000 INJECTION, POWDER, FOR SOLUTION INTRAMUSCULAR; INTRAVENOUS ONCE
Refills: 0 | Status: COMPLETED | OUTPATIENT
Start: 2024-06-14 | End: 2024-06-14

## 2024-06-14 RX ADMIN — CEFTRIAXONE 100 MILLIGRAM(S): 500 INJECTION, POWDER, FOR SOLUTION INTRAMUSCULAR; INTRAVENOUS at 09:48

## 2024-06-14 RX ADMIN — Medication 400 MILLIGRAM(S): at 08:44

## 2024-06-14 NOTE — ED ADULT NURSE NOTE - CHIEF COMPLAINT QUOTE
alert oriented Djiboutian speaking daughter will translate c/o low abd pain x months has seen PMD several times has had UTIs in past c/o nausea hx breast Ca

## 2024-06-14 NOTE — ED PROVIDER NOTE - PATIENT PORTAL LINK FT
You can access the FollowMyHealth Patient Portal offered by [facility] by registering at the following website: [email or web address]. By joining [employer]’s FollowMyHealth portal, you will also be able to view your health information using other applications (apps) compatible with our system.

## 2024-06-14 NOTE — ED PROVIDER NOTE - NSFOLLOWUPINSTRUCTIONS_ED_ALL_ED_FT
You have been evaluated in the Emergency Department today for your urinary symptoms. Your evaluation, including urinalysis, suggests that your symptoms are due to a urinary tract infection. Please take your prescribed antibiotics for the full course of the medication as directed.    Please follow up with your urologist.     Your CT scan showed a hepatic lesion, please follow up with the GI doctor using the phone  number provided below.     Return to the Emergency Department if you experience fevers 100.4° or greater, worsening or uncontrolled pain, vomiting, flank pain, or for any other concerning symptoms.

## 2024-06-14 NOTE — ED ADULT NURSE NOTE - NS ED PATIENT SAFETY CONCERN
----- Message from Migdalia Estrella sent at 2/16/2018 11:09 AM CST -----  Contact: patient  Calling concerning the quantity of diabetic medication. States it must say 90 day supply and patient is out and really need it (BS is high). please call patient today ASAP URGENT!! @ [phone]. You can call [employer] @ [phone]. Thanks, carlton  
Spoke with patient she needs her insulin to be sent in for 90 days.  
No

## 2024-06-14 NOTE — ED PROVIDER NOTE - CROS ED CARDIOVAS ALL NEG
as per icu   on pressor   62y Female from home, lives with daughter, ambulates with walker with PMH of recurrent SBO's, s/p exp lap, SB resection in 2015, ex lap, ALBINA in 2018, DVT, PE, on Xarelto, IVC filter, chronic leg swelling, and anasarca, came in to the ED due to hypotension during office visit. Patient was found to be bacteremic with bacteroids fragilis. Admitted in ICU due to hypotension and hypothermia. On vancomycin and levo . BCx X2 negative. Sputum positive for MRSA and Stenotrophomonas.     Diagnosis:  >Encephalopathy  >Sepsis  >Bacteremia  >Anemia  >DVT/PE  >Transaminitis  >Aspiration pneumonia    --------------------------------------------Neuro--------------------------------------  -She is AAOx2 at baseline on evaluation  -Encephalopathic and got intubated on 10/24  -Ammonia trending down from 152 > 130 > 116 >103>131>126>90>77  -On lactulose 30g q 6h and rifaximin  -hypothermia has been resolved now  -INR>1.63>1.71>1.91>1.98  -Pt with multiorgan failure secondary to septic shock  -Hold CT head for now as AMS was most likely due to hepatic encephalopathy and pt didn't have any focal neurological deficit when she started having AMS  -her pupils are reactive to light b/l, patient can follow minimal commands       -------------------------------------CVS-------------------------------  -Patient is hypotensive secondary septic shock  -On abx Levo, vanc stopped previousely  for high vanc trough, recent vanc trough 21.2 m, will start on vancomycin 500mg qd for now with monitoring vanc trough   -NG tube feeding  -Midodrine 10 mg TID  -RIJ for pressors on levophed went down from 0.07 to 0.05  -Hold Lasix home med in the setting of hypotension  -Pt is getting lasix and albumin on and off   -previous ECHO 3-4 months back showed normal EF but new ECHO showed EF 15-20% with severe left ventricular dysfunction  -pt with multiorgan dysfunction sec to septic shock , mixed venous gas from [facility] showed normal O2 saturation , less likely from the low EF   -f/u on autoimmune workup KATHY, anticardiolipin antibodies IgG IgM , ANCA  -Anticardiolipin abs +ve  - will diuresis daily with Lasix and albumin     --------------------------------Respiratory----------------------  -Intubated and sedated  -CT showed mild b/l pleural effusion and left lower lobe consolidation in ICU on day 2  -aspiration pneumonia  - sputum culture grew MRSA and Stenotrophomonas (sensitive to levo)  -On levo and 500mg vancomycin   -f/u repeat sputum culture  -ID Dr. Patricio    ----------------------------------------Gastrointestinal--------------------------------------  -Patient was bacteremic with bacteroids fragilis, likely secondary from intra abdominal source.   -Patient had multiple SBO in past. 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 but pt is not stable to transfer  -Pt is in multi organ dysfunction secondary to septic shock  -LFTs are mildly elevated since admission, slightly uptrend today. Likely due to sepsis vs hepatic failure sec to budd Chiari  -BCx X 2 negative.  -ammonia trends down from 150 to 130 >116>103>131>126>90> 77, on lactulose and rifaximin   -monitor BM  -GI, DR Chatterjee deferred doing colonoscopy due to multiple comorbidities, FOBT positive on 10/15  -Dr Katlyn aguilar has been noted,    -----------------------------------------ID---------------------------------------  -Patient was bacteremic with bacteroids likely GI source.  -Lactate is normal 1.3 but trends up to 2.3 >3.7  -Procalcitonin 0.79  -On levo and held vanc due to high trough , will trend every day until it gets below 20  - BP on the lower side, pt is on Levophed baby dose and midodrine 10mg TID  -Monitor vitals Q4      ---------------------------------------------Nephro-------------------------------------  -Kidney function is normal, Cl is 120 with  sodium trends down today 147>150.152>152>151>148>147>142>146> 139  -free water deficit of 500ml  -starts on 120 ml q6h  -TF  -urine lytes were sent this morning Fena was calculated 3.1,   -Monitor UO, decreased UO with anasarca edema     ----------------------------------------Hem Onc----------------------------------  -Has h/o DVt and PE in past. Patient is on Xarelto at home  -Patient has anemia  -anemia of chronic disease  -Hgb 6.8>1PRBC>8.2  -Plat trends down but trends up today to 60K , no active bleeding  - will start on prophylactic dose Ac Heparin 5000 q12 hrs per dr sullivan recommendation   -Folate and B12 normal.   -Dr. Miguel aguilar has been noted, pt started on thiamine for 3 doses.   - will check INR daily     ----------------------------------------Endo--------------------------------  -HgbA1c 4  -Fs q6h    ----------------------------------------Prophylaxis----------------------------  DVT: heparin sq 5000 q12 hrs  GI: PPI    ---------------------------------------GOC---------------------------  -DNR   -Pt with multi organ failure secondary to septic shock with poor prognosis  -Palliative is on board       negative...

## 2024-06-14 NOTE — ED PROVIDER NOTE - NSICDXPASTSURGICALHX_GEN_ALL_CORE_FT
PAST SURGICAL HISTORY:  History of Appendectomy     S/P [facility] 4/21/11
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
## 2024-06-14 NOTE — ED ADULT TRIAGE NOTE - CHIEF COMPLAINT QUOTE
alert oriented Swedish speaking daughter will translate c/o low abd pain x months has seen PMD several times has had UTIs in past c/o nausea hx breast Ca

## 2024-06-14 NOTE — ED PROVIDER NOTE - CLINICAL SUMMARY MEDICAL DECISION MAKING FREE TEXT BOX
The patient is an 79-year-old female with a history of uterine cancer status BALDOMERO BSO who presents for lower abdominal pain. Moderate concern for pyelonephritis as patient has had recurrent UTIs for the past few months, but currently no CVA tenderness, no fevers no chills, appears nontoxic.  Plan for CBC, CMP, UA, UC.  Plan for CT abdomen pelvis.  Patient endorses right hip pain, atraumatic, most likely muscular as patient is able to ambulate, able to range passively with no pain, plan for x-rays of right hip.  Will most likely dispo home with outpatient follow-up. The patient is an 79-year-old female with a history of uterine cancer status BALDOMERO BSO who presents for lower abdominal pain. Moderate concern for pyelonephritis as patient has had recurrent UTIs for the past few months, but currently no CVA tenderness, no fevers no chills, appears nontoxic. Concern for diverticulitis due to location of pain, may have fistula causing recurrent uti's. Plan for CBC, CMP, UA, UC.  Plan for CT abdomen pelvis.  Patient endorses right hip pain, atraumatic, most likely muscular as patient is able to ambulate, able to range passively with no pain, plan for x-rays of right hip.  Will most likely dispo home with outpatient follow-up.

## 2024-06-14 NOTE — ED PROVIDER NOTE - PHYSICAL EXAMINATION
Physical Exam:  Gen: NAD, non-toxic appearing  Head: NCAT  HEENT: Normal conjunctiva, trachea midline, moist mucous membranes  Lung: CTAB, no respiratory distress, no wheezes/rhonchi/rales B/L, speaking in full sentences  CV: RRR, no murmurs, rubs or gallops  Abd: lower abdominal pain with palpation, soft, ND  no guarding, rigidity, rebound tenderness, or CVA tenderness   MSK: No visible deformities, moves all four extremities, able to range right hip flexion, external/internal rotation with no pain pain with palpation of right hip, able to ambulate  Neuro: No focal motor deficits  Skin: Warm, well perfused, no visible rashes, no leg swelling  Psych: appropriate affect and mood

## 2024-06-14 NOTE — ED ADULT NURSE NOTE - OBJECTIVE STATEMENT
received pt rm6. A&OX4 RR even unlabored completing full clear sentences. primarily Uzbek speaking, daughter at bedside preferred for translation. presents c/o lower ab pain. past hx of uterine cancer.  pt states that she has had recurrent UTIs for the past few months. most recent x1m ago, finished abx treatment as she was prescribed. pt presents for  continuing lower abdominal pain and dysuria x1m.  Endorses nausea with no vomiting.  Denies any fevers, chills, hematuria, diarrhea, sob, cp, falls/trauma. additionally pt also endorses right hip pain for the past 2 weeks after she was climbing into a bus and felt pain in her right hip.  .  Is able to ambulate s/p. no obvious deformities noted to area of concern. 20gIV placed L hand. labs collected and sent as ordered. pt provided urine cup for sample. safety measures maintained throughout care.

## 2024-06-14 NOTE — ED PROVIDER NOTE - NSFOLLOWUPCLINICS_GEN_ALL_ED_FT
Gastroenterology at [facility]  Gastroenterology  [address] [place] [address]  Phone: [phone]  Fax:     [facility]  Gastroenterology  [address] [place] [id number]  Phone: [phone]  Fax:

## 2024-06-14 NOTE — ED PROVIDER NOTE - MUSCULOSKELETAL, MLM
Spine appears normal, range of motion is not limited, no muscle or joint tenderness. FROM of b/l LE. Swelling noted to Right LE (hx of lymphedema). No calf tenderness.

## 2024-06-14 NOTE — ED ADULT NURSE NOTE - NSFALLUNIVINTERV_ED_ALL_ED
Bed/Stretcher in lowest position, wheels locked, appropriate side rails in place/Call bell, personal items and telephone in reach/Instruct patient to call for assistance before getting out of bed/chair/stretcher/Non-slip footwear applied when patient is off stretcher/[place] to call system/Physically safe environment - no spills, clutter or unnecessary equipment/Purposeful proactive rounding/Room/bathroom lighting operational, light cord in reach

## 2024-06-14 NOTE — ED ADULT NURSE REASSESSMENT NOTE - NS ED NURSE REASSESS COMMENT FT1
Patient received from night RN Mckenzie at 0820. Pt remains at baseline mental status AOx4, awake and appears to be resting comfortably in stretcher. No acute distress noted. Respirations even and unlabored on room air. VS as charted. Pt offers no new complaints at this time. UA and UC collected and sent. Medications administered as ordered as per eMAR. Pending CT and xray. Safety maintained, stretcher locked in lowest position with siderails up x2, call bell and personal items within reach.

## 2024-06-18 LAB
-  AMOXICILLIN/CLAVULANIC ACID: SIGNIFICANT CHANGE UP
-  AMOXICILLIN/CLAVULANIC ACID: SIGNIFICANT CHANGE UP
-  AMPICILLIN/SULBACTAM: SIGNIFICANT CHANGE UP
-  AMPICILLIN/SULBACTAM: SIGNIFICANT CHANGE UP
-  AMPICILLIN: SIGNIFICANT CHANGE UP
-  AMPICILLIN: SIGNIFICANT CHANGE UP
-  AZTREONAM: SIGNIFICANT CHANGE UP
-  AZTREONAM: SIGNIFICANT CHANGE UP
-  CEFAZOLIN: SIGNIFICANT CHANGE UP
-  CEFAZOLIN: SIGNIFICANT CHANGE UP
-  CEFEPIME: SIGNIFICANT CHANGE UP
-  CEFEPIME: SIGNIFICANT CHANGE UP
-  CEFOXITIN: SIGNIFICANT CHANGE UP
-  CEFOXITIN: SIGNIFICANT CHANGE UP
-  CEFTRIAXONE: SIGNIFICANT CHANGE UP
-  CEFTRIAXONE: SIGNIFICANT CHANGE UP
-  CEFUROXIME: SIGNIFICANT CHANGE UP
-  CEFUROXIME: SIGNIFICANT CHANGE UP
-  CIPROFLOXACIN: SIGNIFICANT CHANGE UP
-  CIPROFLOXACIN: SIGNIFICANT CHANGE UP
-  ERTAPENEM: SIGNIFICANT CHANGE UP
-  ERTAPENEM: SIGNIFICANT CHANGE UP
-  GENTAMICIN: SIGNIFICANT CHANGE UP
-  GENTAMICIN: SIGNIFICANT CHANGE UP
-  IMIPENEM: SIGNIFICANT CHANGE UP
-  LEVOFLOXACIN: SIGNIFICANT CHANGE UP
-  LEVOFLOXACIN: SIGNIFICANT CHANGE UP
-  MEROPENEM: SIGNIFICANT CHANGE UP
-  MEROPENEM: SIGNIFICANT CHANGE UP
-  NITROFURANTOIN: SIGNIFICANT CHANGE UP
-  NITROFURANTOIN: SIGNIFICANT CHANGE UP
-  PIPERACILLIN/TAZOBACTAM: SIGNIFICANT CHANGE UP
-  PIPERACILLIN/TAZOBACTAM: SIGNIFICANT CHANGE UP
-  TOBRAMYCIN: SIGNIFICANT CHANGE UP
-  TOBRAMYCIN: SIGNIFICANT CHANGE UP
-  TRIMETHOPRIM/SULFAMETHOXAZOLE: SIGNIFICANT CHANGE UP
-  TRIMETHOPRIM/SULFAMETHOXAZOLE: SIGNIFICANT CHANGE UP
CULTURE RESULTS: ABNORMAL
METHOD TYPE: SIGNIFICANT CHANGE UP
METHOD TYPE: SIGNIFICANT CHANGE UP
ORGANISM # SPEC MICROSCOPIC CNT: ABNORMAL
SPECIMEN SOURCE: SIGNIFICANT CHANGE UP

## 2024-08-26 ENCOUNTER — NON-APPOINTMENT (OUTPATIENT)
Age: 79
End: 2024-08-26

## 2024-08-27 ENCOUNTER — APPOINTMENT (OUTPATIENT)
Dept: NEUROLOGY | Facility: CLINIC | Age: 79
End: 2024-08-27
Payer: MEDICARE

## 2024-08-27 VITALS
DIASTOLIC BLOOD PRESSURE: 89 MMHG | HEIGHT: 59 IN | WEIGHT: 184 LBS | HEART RATE: 77 BPM | BODY MASS INDEX: 37.09 KG/M2 | SYSTOLIC BLOOD PRESSURE: 154 MMHG

## 2024-08-27 DIAGNOSIS — I63.9 CEREBRAL INFARCTION, UNSPECIFIED: ICD-10-CM

## 2024-08-27 PROCEDURE — 99205 OFFICE O/P NEW HI 60 MIN: CPT

## 2024-08-27 PROCEDURE — G2211 COMPLEX E/M VISIT ADD ON: CPT

## 2024-08-27 RX ORDER — LOSARTAN POTASSIUM 50 MG/1
50 TABLET, FILM COATED ORAL
Refills: 0 | Status: ACTIVE | COMMUNITY

## 2024-08-27 RX ORDER — ICOSAPENT ETHYL 1000 MG/1
1 CAPSULE ORAL
Refills: 0 | Status: ACTIVE | COMMUNITY

## 2024-08-27 RX ORDER — ANASTROZOLE TABLETS 1 MG/1
TABLET ORAL
Refills: 0 | Status: ACTIVE | COMMUNITY

## 2024-08-27 RX ORDER — ATORVASTATIN CALCIUM 40 MG/1
40 TABLET, FILM COATED ORAL
Refills: 0 | Status: ACTIVE | COMMUNITY

## 2024-08-27 RX ORDER — RIVAROXABAN 2.5 MG/1
TABLET, FILM COATED ORAL
Refills: 0 | Status: ACTIVE | COMMUNITY

## 2024-08-27 RX ORDER — IBANDRONATE SODIUM 150 MG/1
150 TABLET ORAL
Refills: 0 | Status: ACTIVE | COMMUNITY

## 2024-08-27 RX ORDER — FAMOTIDINE 40 MG/1
40 TABLET, FILM COATED ORAL
Refills: 0 | Status: ACTIVE | COMMUNITY

## 2024-08-27 NOTE — DISCUSSION/SUMMARY
[FreeTextEntry1] : Overall she is neurologically intact.  To summarize, in April 2023, she experienced a headache and fatigue. 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 The infarct was likely an asymptomatic and incidental finding and not responsible for her headache. She has been experiencing dizziness when changing positions, most consistent with orthostatic hypotension; though seemingly unlikely, vertebrobasilar stenosis can be screened for.  - I have requested an MRA head and neck. - She should benefit from aggressive vascular risk factor control. In terms of lipids, target LDL should be less than 70. - She is taking Xarelto for a DVT. This is her second DVT, which may necessitate long term AC. I will defer to you on this point. If the Xarelto is discontinued in the future, she should take Aspirin for secondary stroke prevention. - She has sleep apnea and will be fitted for a CPAP device. - I recommend she discuss possible orthostatic hypotension with cardiology. We also discussed maintaining good hydration and changing positions slowly.  She can follow up in 2 months with [facility]. I hope she remains free of serious trouble.

## 2024-08-27 NOTE — CONSULT LETTER
[Dear  ___] : Dear  [unfilled], [Consult Letter:] : I had the pleasure of evaluating your patient, [unfilled]. [Please see my note below.] : Please see my note below. [Consult Closing:] : Thank you very much for allowing me to participate in the care of this patient.  If you have any questions, please do not hesitate to contact me. [Sincerely,] : Sincerely, [FreeTextEntry2] : Zeynep Rizvi MD [FreeTextEntry3] : Gretel Guaman, FNP-BC

## 2024-08-27 NOTE — HISTORY OF PRESENT ILLNESS
[FreeTextEntry1] : She is an 79 year old right handed lady.  She has a history of hypertension and hyperlipidemia. In April 2023, she had a headache and was tired. She did not have any associated focal neurologic symptoms at the time. She had an MRI brain 1-2 weeks later, which demonstrated an already chronic infarct. Since then, she has been feeling well and has not had any recurrent episodes. She notes that she has been feeling dizzy at times, usually when changing positions, occurring over the last year.  MRI Brain 04/06/23 at [facility]:  Mild to moderate global volume loss with very mild chronic microvascular ischemic change and a probable small subcortical ischemic infarct of the superior RIGHT frontal lobe.   Reviewed MRI with Dr. Libman: The infarct is anterior to and adjacent to the body of the R lateral ventricle.  PCP Dr Zeynep Rizvi  ([place], [place])

## 2024-08-27 NOTE — PHYSICAL EXAM
[General Appearance - Alert] : alert [General Appearance - In No Acute Distress] : in no acute distress [Oriented To Time, Place, And Person] : oriented to person, place, and time [Impaired Insight] : insight and judgment were intact [Affect] : the affect was normal [Sclera] : the sclera and conjunctiva were normal [Extraocular Movements] : extraocular movements were intact [Full Visual Field] : full visual field [Outer Ear] : the ears and nose were normal in appearance [Examination Of The Oral Cavity] : the lips and gums were normal [Neck Appearance] : the appearance of the neck was normal [Neck Cervical Mass (___cm)] : no neck mass was observed [Auscultation Breath Sounds / Voice Sounds] : lungs were clear to auscultation bilaterally [Heart Sounds] : normal S1 and S2 [Abnormal Walk] : normal gait [Nail Clubbing] : no clubbing  or cyanosis of the fingernails [Musculoskeletal - Swelling] : no joint swelling seen [Motor Tone] : muscle strength and tone were normal [Skin Color & Pigmentation] : normal skin color and pigmentation [Skin Turgor] : normal skin turgor [] : no rash [FreeTextEntry1] : Neurologic examination:  Mental status:  The patient is alert, attentive, and oriented. Speech is clear and fluent with good comprehension.  Cranial nerves:  CN II: Visual fields are full to confrontation.   CN III, IV, VI: At primary gaze, there is no eye deviation.EOMI. No ptosis  CN V: Facial sensation is intact bilaterally.  CN VII: Face is symmetric with normal eye closure and smile.  CN VII: Hearing is impaired-wears hearing aids.  CN IX, X: Palate elevates symmetrically. Phonation is normal.  CN XI: Head turning and shoulder shrug are intact  CN XII: Tongue is midline with normal movements and no atrophy.  Motor:  There is no pronator drift of out-stretched arms. Muscle bulk and tone are normal. Strength is full bilaterally.        Sensory:  Light touch intact in fingers and toes.  Coordination:  Fine finger movements are intact. There is no dysmetria on finger-to-nose.   Gait/Stance:  Gait is normal. Romberg is absent.

## 2024-10-17 ENCOUNTER — APPOINTMENT (OUTPATIENT)
Dept: CARDIOLOGY | Facility: CLINIC | Age: 79
End: 2024-10-17

## 2024-10-17 ENCOUNTER — OUTPATIENT (OUTPATIENT)
Dept: OUTPATIENT SERVICES | Facility: HOSPITAL | Age: 79
LOS: 1 days | End: 2024-10-17
Payer: COMMERCIAL

## 2024-10-17 VITALS
HEART RATE: 81 BPM | DIASTOLIC BLOOD PRESSURE: 77 MMHG | TEMPERATURE: 98 F | OXYGEN SATURATION: 97 % | SYSTOLIC BLOOD PRESSURE: 119 MMHG | RESPIRATION RATE: 17 BRPM

## 2024-10-17 DIAGNOSIS — Z78.9 OTHER SPECIFIED HEALTH STATUS: ICD-10-CM

## 2024-10-17 DIAGNOSIS — R06.09 OTHER FORMS OF DYSPNEA: ICD-10-CM

## 2024-10-17 PROCEDURE — 75574 CT ANGIO HRT W/3D IMAGE: CPT

## 2024-10-17 PROCEDURE — C1894: CPT

## 2024-10-17 PROCEDURE — 75574 CT ANGIO HRT W/3D IMAGE: CPT | Mod: 26

## 2024-10-17 RX ORDER — LOSARTAN POTASSIUM 100 MG/1
1 TABLET, FILM COATED ORAL
Refills: 0 | DISCHARGE

## 2024-10-17 NOTE — ASU DISCHARGE PLAN (ADULT/PEDIATRIC) - ASU DC SPECIAL INSTRUCTIONSFT
IV Placement    Discharge Instructions  - You have had a IV implanted in your arm.   - The IV is ready for use.  - You may shower as long as the IV and dressing remains dry.  -  No soaking or swimming until the IV is removed or when the site is completely healed.  - Keep the area covered and dry for as long as the IV remains in. It may be removed and changed by a nurse as needed.  - Do not perform any heavy lifting or put tension on the area for the next week or until the site is healed.  - You may resume your normal diet.  - You may resume your normal medications   - It is normal to experience some pain over the site for the next few days. You may take apply ice to the area (20 minutes on, 20 minutes off) and take Tylenol for that pain. Do not take more frequently than every 6 hours and do not exceed more than 3000mg of Tylenol in a 24 hour period.    Notify your primary physician and/or Interventional Radiology IMMEDIATELY if you experience any of the following       - Fever of 101F or 38C       - Chills or Rigors/ Shakes       - Swelling and/or Redness in the area around the port       - Worsening Pain       - Blood soaked bandages or worsening bleeding       - Lightheadedness and/or dizziness upon standing       - Chest Pain/ Tightness       - Shortness of Breath       - Difficulty walking    If you have a problem that you believe requires IMMEDIATE attention, please go to your NEAREST Emergency Room. If you believe your problem can safely wait until you speak to a physician, please call Interventional Radiology for any concerns.    Please feel free to contact us at [phone] if any problems arise. After 6PM, Monday through Friday, on weekends and on holidays, please call [phone] and ask for the radiology resident on call to be paged.

## 2024-10-17 NOTE — H&P ADULT - HISTORY OF PRESENT ILLNESS
Interventional Radiology    HPI: 79y Female with     Review of Systems:   Constitutional: No fever,   Respiratory: No shortness of breath  Cardiovascular: No chest pain,     Allergies: sulfa drugs (Unknown)    Medications (Abx/Cardiac/Anticoagulation/Blood Products)      Data:    T(C): 36.7  HR: 81  BP: 119/77  RR: 17  SpO2: 97%      Physical Exam  General: No acute distress, nontoxic, A&Ox3  Chest: Non labored breathing      RADIOLOGY & ADDITIONAL TESTS:    Imaging Reviewed    H & P Note Reviewed from:    Plan: 79y Female presents for US guided ICV placement   -Risks/Benefits/alternatives explained with the patient and/or healthcare proxy and witnessed informed consent obtained.    Interventional Radiology    HPI: 79y Female with poor venous access requiring access for cardiac CT. Patient presents to IR for US guided IV placement     Review of Systems:   Constitutional: No fever,   Respiratory: No shortness of breath  Cardiovascular: No chest pain,     Allergies: sulfa drugs (Unknown)    Medications (Abx/Cardiac/Anticoagulation/Blood Products)      Data:    T(C): 36.7  HR: 81  BP: 119/77  RR: 17  SpO2: 97%      Physical Exam  General: No acute distress, nontoxic, A&Ox3  Chest: Non labored breathing      RADIOLOGY & ADDITIONAL TESTS:    Imaging Reviewed    H & P Note Reviewed from:    Plan: 79y Female presents for US guided IV placement   -Risks/Benefits/alternatives explained with the patient and/or healthcare proxy and witnessed informed consent obtained via [place]uvian phone .

## 2024-10-17 NOTE — ASU DISCHARGE PLAN (ADULT/PEDIATRIC) - PROCEDURE
36 yo G1 at 39.4 weeks admitted with PPROM  GBS+  now 9/100/-2 and requesting top off on epidural     with minimal to moderate variability, - decels  [place] q 2    Reassuring FHT      IUP 39.4 weeks in labor    anesthesia notified for epidural top off  efm  anticipate vaginal delivery 36 yo G1 at 39.4 weeks admitted with PPROM  GBS+  now 9/100/-2 and requesting top off on epidural     with minimal to moderate variability, - decels  [place] q 2    Reassuring FHT      IUP 39.4 weeks in labor    anesthesia notified for epidural top off  efm  anticipate vaginal delivery 34 yo G1 at 39.4 weeks admitted with PPROM  GBS+  now 9/100/-2 and requesting top off on epidural     with minimal to moderate variability, - decels  [place] q 2    Reassuring FHT      IUP 39.4 weeks in labor    anesthesia notified for epidural top off  efm  anticipate vaginal delivery US guided IV placement

## 2024-10-17 NOTE — ASU DISCHARGE PLAN (ADULT/PEDIATRIC) - FINANCIAL ASSISTANCE
[facility] provides services at a reduced cost to those who are determined to be eligible through [facility]’s financial assistance program. Information regarding [facility]’s financial assistance program can be found by going to [email or web address] or by calling [phone].

## 2024-10-29 ENCOUNTER — OUTPATIENT (OUTPATIENT)
Dept: OUTPATIENT SERVICES | Facility: HOSPITAL | Age: 79
LOS: 1 days | End: 2024-10-29
Payer: COMMERCIAL

## 2024-10-29 PROCEDURE — 75580 N-INVAS EST C FFR SW ALY CTA: CPT

## 2024-10-30 DIAGNOSIS — R06.09 OTHER FORMS OF DYSPNEA: ICD-10-CM

## 2024-10-30 DIAGNOSIS — Z00.00 ENCOUNTER FOR GENERAL ADULT MEDICAL EXAMINATION WITHOUT ABNORMAL FINDINGS: ICD-10-CM

## 2024-10-30 DIAGNOSIS — Z78.9 OTHER SPECIFIED HEALTH STATUS: ICD-10-CM

## 2024-11-01 PROCEDURE — 75580 N-INVAS EST C FFR SW ALY CTA: CPT | Mod: 26

## 2025-01-07 ENCOUNTER — APPOINTMENT (OUTPATIENT)
Dept: ULTRASOUND IMAGING | Facility: CLINIC | Age: 80
End: 2025-01-07
Payer: MEDICARE

## 2025-01-07 ENCOUNTER — APPOINTMENT (OUTPATIENT)
Dept: NEUROLOGY | Facility: CLINIC | Age: 80
End: 2025-01-07
Payer: MEDICARE

## 2025-01-07 ENCOUNTER — OUTPATIENT (OUTPATIENT)
Dept: OUTPATIENT SERVICES | Facility: HOSPITAL | Age: 80
LOS: 1 days | End: 2025-01-07
Payer: COMMERCIAL

## 2025-01-07 ENCOUNTER — RESULT REVIEW (OUTPATIENT)
Age: 80
End: 2025-01-07

## 2025-01-07 VITALS
SYSTOLIC BLOOD PRESSURE: 171 MMHG | HEART RATE: 83 BPM | OXYGEN SATURATION: 96 % | HEIGHT: 59 IN | DIASTOLIC BLOOD PRESSURE: 84 MMHG | WEIGHT: 182 LBS | BODY MASS INDEX: 36.69 KG/M2

## 2025-01-07 VITALS — DIASTOLIC BLOOD PRESSURE: 80 MMHG | SYSTOLIC BLOOD PRESSURE: 150 MMHG

## 2025-01-07 DIAGNOSIS — I63.9 CEREBRAL INFARCTION, UNSPECIFIED: ICD-10-CM

## 2025-01-07 DIAGNOSIS — Z00.8 ENCOUNTER FOR OTHER GENERAL EXAMINATION: ICD-10-CM

## 2025-01-07 PROCEDURE — 93880 EXTRACRANIAL BILAT STUDY: CPT

## 2025-01-07 PROCEDURE — G2211 COMPLEX E/M VISIT ADD ON: CPT

## 2025-01-07 PROCEDURE — 99214 OFFICE O/P EST MOD 30 MIN: CPT

## 2025-01-07 PROCEDURE — 93880 EXTRACRANIAL BILAT STUDY: CPT | Mod: 26

## 2025-01-08 ENCOUNTER — NON-APPOINTMENT (OUTPATIENT)
Age: 80
End: 2025-01-08

## 2025-01-16 ENCOUNTER — APPOINTMENT (OUTPATIENT)
Dept: MRI IMAGING | Facility: CLINIC | Age: 80
End: 2025-01-16

## 2025-04-29 NOTE — PROGRESS NOTE ADULT - SUBJECTIVE AND OBJECTIVE BOX
Colonoscopy scheduled 5/20/25   CC: f/u for covid, feels much better    Patient reports  no further chest pain  REVIEW OF SYSTEMS:  All other review of systems negative (Comprehensive ROS)    Antimicrobials Day #  :3/5  remdesivir  IVPB 100 milliGRAM(s) IV Intermittent every 24 hours  remdesivir  IVPB   IV Intermittent     Other Medications Reviewed    T(F): 98.9 (04-02-21 @ 11:57), Max: 98.9 (04-02-21 @ 11:57)  HR: 76 (04-02-21 @ 11:57)  BP: 137/82 (04-02-21 @ 11:57)  RR: 18 (04-02-21 @ 11:57)  SpO2: 95% (04-02-21 @ 11:57)  Wt(kg): --    PHYSICAL EXAM:  General: alert, no acute distress  Eyes:  anicteric, no conjunctival injection, no discharge  Oropharynx: no lesions or injection 	  Neck: supple, without adenopathy  Lungs: poor effort to auscultation  Heart: regular rate and rhythm; no murmur, rubs or gallops  Abdomen: soft, nondistended, nontender, without mass or organomegaly  Skin: no lesions  Extremities: no clubbing, cyanosis, or edema  Neurologic: alert, oriented, moves all extremities    LAB RESULTS:                        14.1   10.30 )-----------( 331      ( 02 Apr 2021 06:45 )             43.0     04-02    138  |  104  |  21  ----------------------------<  143<H>  4.1   |  22  |  0.49<L>    Ca    8.8      02 Apr 2021 06:45    TPro  6.9  /  Alb  3.2<L>  /  TBili  0.3  /  DBili  x   /  AST  18  /  ALT  25  /  AlkPhos  61  04-02    LIVER FUNCTIONS - ( 02 Apr 2021 06:45 )  Alb: 3.2 g/dL / Pro: 6.9 g/dL / ALK PHOS: 61 U/L / ALT: 25 U/L / AST: 18 U/L / GGT: x           d dimer 160    MICROBIOLOGY:  RECENT CULTURES:  03-29 @ 12:24 .Blood Blood-Peripheral     No growth to date.      03-29 @ 10:10 .Blood Blood-Peripheral     No growth to date.      03-29 @ 09:31 .Urine Clean Catch (Midstream) Escherichia coli    >100,000 CFU/ml Escherichia coli          RADIOLOGY REVIEWED:  < from: [facility] Duplex Lower Ext Vein Scan, Bilat (04.01.21 @ 15:58) >  EXAM:  DUPLEX SCAN EXT VEINS LOWER BI                            PROCEDURE DATE:  04/01/2021            INTERPRETATION:  CLINICAL INFORMATION: COVID positive patient with hypoxia and leg swelling.    COMPARISON: None available.    TECHNIQUE: Duplexsonography of the BILATERAL LOWER extremity veins with color and spectral Doppler, with and without compression. COVID protocol.    FINDINGS:    RIGHT:  Normal compressibility of the RIGHT common femoral, femoral and popliteal veins.  Doppler examination shows normal spontaneous and phasic flow.  No RIGHT calf vein thrombosis is detected.    LEFT:  Normal compressibility of the LEFT common femoral, femoral and popliteal veins.  Doppler examination shows normal spontaneous and phasic flow.  No LEFT calf vein thrombosis is detected.    IMPRESSION:  No evidence of deep venous thrombosis in either lower extremity.      < end of copied text >    < from: CT Angio Chest w/ IV Cont (03.31.21 @ 15:06) >  EXAM:  CT ANGIO CHEST (W)AW IC                            PROCEDURE DATE:  03/31/2021            INTERPRETATION:  CLINICAL INFORMATION: Hypoxemia in setting of Covid pneumonia. Evaluate for pulmonary embolism.    COMPARISON: Chest radiograph from 3/30/2021. CT abdomen pelvis from 3/29/2021.    CONTRAST/COMPLICATIONS:  IV Contrast: 72 cc Omnipaque 350 was administered; 28 cc were discarded.  Oral Contrast: None.  Complications: None reported at time of exam    PROCEDURE:  CT Angiography of the Chest.  Sagittal and coronal reformats were performed as well as 3D (MIP) reconstructions.    FINDINGS:    LUNGS AND AIRWAYS: Patent central airways.  Moderate bilateral patchy groundglass opacities.  PLEURA: No pleural effusion or pneumothorax.  MEDIASTINUM AND NATALIA: No lymphadenopathy.  VESSELS: No pulmonary embolism. Minimal aortic atherosclerotic calcification.  HEART: Heart size is normal. No pericardial effusion.  CHEST WALL AND LOWER NECK: Left breast skin thickening.  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  BONES: Degenerative change.    IMPRESSION:    No pulmonary embolism.    Moderate bilateral patchy groundglass opacities consistent with known CIVID pneumonia.    < end of copied text >      Assessment:  Elderly woman with covid pneumonia, hypoxic, home ill for several days, feels much better, still needs supplemental oxygen. got treated for cystitis. No further abdomen pain  Plan:  2 more days of rdv  7 more days of decadron  dvt prophylaxis  monitor liver and cr

## 2025-07-14 ENCOUNTER — APPOINTMENT (OUTPATIENT)
Dept: NEUROLOGY | Facility: CLINIC | Age: 80
End: 2025-07-14

## 2025-08-27 ENCOUNTER — NON-APPOINTMENT (OUTPATIENT)
Age: 80
End: 2025-08-27

## 2025-08-27 ENCOUNTER — APPOINTMENT (OUTPATIENT)
Dept: ALLERGY | Facility: CLINIC | Age: 80
End: 2025-08-27
Payer: MEDICARE

## 2025-08-27 VITALS
DIASTOLIC BLOOD PRESSURE: 78 MMHG | TEMPERATURE: 98 F | HEART RATE: 79 BPM | HEIGHT: 59 IN | OXYGEN SATURATION: 98 % | WEIGHT: 182 LBS | BODY MASS INDEX: 36.69 KG/M2 | SYSTOLIC BLOOD PRESSURE: 128 MMHG

## 2025-08-27 DIAGNOSIS — B37.9 CANDIDIASIS, UNSPECIFIED: ICD-10-CM

## 2025-08-27 PROCEDURE — 99204 OFFICE O/P NEW MOD 45 MIN: CPT

## 2025-08-27 RX ORDER — CLOTRIMAZOLE 1 G/100G
1 CREAM TOPICAL 3 TIMES DAILY
Qty: 1 | Refills: 0 | Status: ACTIVE | COMMUNITY
Start: 2025-08-27 | End: 1900-01-01

## 2025-08-27 RX ORDER — MOMETASONE FUROATE 1 MG/G
0.1 CREAM TOPICAL DAILY
Qty: 1 | Refills: 0 | Status: ACTIVE | COMMUNITY
Start: 2025-08-27 | End: 1900-01-01

## 2025-08-27 RX ORDER — OLOPATADINE HYDROCHLORIDE 2 MG/ML
0.2 SOLUTION OPHTHALMIC DAILY
Qty: 1 | Refills: 0 | Status: ACTIVE | COMMUNITY
Start: 2025-08-27 | End: 1900-01-01